# Patient Record
Sex: FEMALE | Race: WHITE | NOT HISPANIC OR LATINO | Employment: FULL TIME | URBAN - METROPOLITAN AREA
[De-identification: names, ages, dates, MRNs, and addresses within clinical notes are randomized per-mention and may not be internally consistent; named-entity substitution may affect disease eponyms.]

---

## 2024-08-05 PROBLEM — Z34.00 SUPERVISION OF NORMAL FIRST PREGNANCY, ANTEPARTUM: Status: ACTIVE | Noted: 2024-08-05

## 2024-08-05 PROBLEM — Z00.00 PREVENTATIVE HEALTH CARE: Status: RESOLVED | Noted: 2020-02-05 | Resolved: 2024-08-05

## 2024-08-05 PROBLEM — Z12.4 SCREENING FOR CERVICAL CANCER: Status: RESOLVED | Noted: 2023-02-18 | Resolved: 2024-08-05

## 2024-08-05 PROBLEM — K21.9 GASTROESOPHAGEAL REFLUX DISEASE WITHOUT ESOPHAGITIS: Status: RESOLVED | Noted: 2022-11-30 | Resolved: 2024-08-05

## 2024-08-05 PROBLEM — J01.10 ACUTE NON-RECURRENT FRONTAL SINUSITIS: Status: RESOLVED | Noted: 2020-05-17 | Resolved: 2024-08-05

## 2024-08-27 ENCOUNTER — ROUTINE PRENATAL (OUTPATIENT)
Dept: OBSTETRICS AND GYNECOLOGY | Facility: CLINIC | Age: 34
End: 2024-08-27
Payer: COMMERCIAL

## 2024-08-27 VITALS — SYSTOLIC BLOOD PRESSURE: 123 MMHG | BODY MASS INDEX: 28.79 KG/M2 | WEIGHT: 173 LBS | DIASTOLIC BLOOD PRESSURE: 81 MMHG

## 2024-08-27 DIAGNOSIS — Z34.00 SUPERVISION OF NORMAL FIRST PREGNANCY, ANTEPARTUM: Primary | ICD-10-CM

## 2024-08-27 LAB
GLUCOSE UR STRIP-MCNC: NEGATIVE MG/DL
PROT UR STRIP-MCNC: NEGATIVE MG/DL

## 2024-08-27 NOTE — PROGRESS NOTES
[facility]  OB Follow Up Visit    CC: Routine obstetrical visit    Prenatal care complicated by:  Patient Active Problem List   Diagnosis    Anxiety    Supervision of normal first pregnancy, antepartum     Subjective:   Kelley L Naegele is a 34 y.o.  12w5d patient being seen today for her obstetrical follow up visit. The patient has: No complaints, No leaking fluid, No vaginal bleeding, No contractions    History: Past medical and surgical history, medications, allergies, social history, and obstetrical history all reviewed and updated.    Objective:    Urine glucose/protein - See OB flow sheet      /81   Wt 78.5 kg (173 lb)   LMP 2024 (Exact Date)   BMI 28.79 kg/m²     General exam: Comfortable, NAD  FHR: 150 BPM   Uterine Size: size equals dates  Pelvic Exam: No    Assessment and Plan:  Diagnoses and all orders for this visit:    1. Supervision of normal first pregnancy, antepartum (Primary)  Overview:  EDC: 3/6/2025    Prenatal genetic screening: Nips     COVID-19 vaccine: None, booster recommended  Flu vaccine: Recommended  Tdap vaccine:      Assessment & Plan:  Reviewed prenatal labs  Patient desires nips testing today  Schedule anatomy ultrasound  Continue prenatal vitamin    Orders:  -     POC Urinalysis Dipstick  -     [id number] PLUS Core+ESS - Blood, Arm, Left  -     US Ob Detail Fetal Anatomy Single or First Gestation; Future      12w5d  Reassuring pregnancy progress    Counseling: Second trimester precautions  OB precautions, leaking, VB, emely stewart vs PTL/Labor    Questions answered    Return in about 4 weeks (around 2024) for Recheck.    Manuelito Moralez MD  2024

## 2024-08-28 ENCOUNTER — TELEPHONE (OUTPATIENT)
Dept: OBSTETRICS AND GYNECOLOGY | Facility: CLINIC | Age: 34
End: 2024-08-28
Payer: COMMERCIAL

## 2024-08-28 NOTE — TELEPHONE ENCOUNTER
Called patient left message.  Calling to inform pt of her appointment 10/21/24 @ 2:00 with Ultrasound & fu with Dr Hong @ 3:15.  Patient not aware.  Okay for Hub to Relay. Also

## 2024-08-29 NOTE — ASSESSMENT & PLAN NOTE
Reviewed prenatal labs  Patient desires nips testing today  Schedule anatomy ultrasound  Continue prenatal vitamin

## 2024-08-31 LAB
5P15 DELETION (CRI-DU-CHAT): NOT DETECTED
CFDNA.FET/CFDNA.TOTAL SFR FETUS: NORMAL %
CITATION REF LAB TEST: NORMAL
FET 13+18+21+X+Y ANEUP PLAS.CFDNA: NEGATIVE
FET 1P36 DEL RISK WBC.DNA+CFDNA QL: NOT DETECTED
FET 22Q11.2 DEL RISK WBC.DNA+CFDNA QL: NOT DETECTED
FET CHR 11Q23 DEL PLAS.CFDNA QL: NOT DETECTED
FET CHR 15Q11 DEL PLAS.CFDNA QL: NOT DETECTED
FET CHR 21 TS PLAS.CFDNA QL: NEGATIVE
FET CHR 4P16 DEL PLAS.CFDNA QL: NOT DETECTED
FET CHR 8Q24 DEL PLAS.CFDNA QL: NOT DETECTED
FET SEX PLAS.CFDNA DOSAGE CFDNA: NORMAL
FET TS 13 RISK PLAS.CFDNA QL: NEGATIVE
FET TS 18 RISK WBC.DNA+CFDNA QL: NEGATIVE
GA EST FROM CONCEPTION DATE: NORMAL D
GESTATIONAL AGE > 9:: YES
LAB DIRECTOR NAME PROVIDER: NORMAL
LAB DIRECTOR NAME PROVIDER: NORMAL
LABORATORY COMMENT REPORT: NORMAL
LIMITATIONS OF THE TEST: NORMAL
NEGATIVE PREDICTIVE VALUE: NORMAL
NOTE: NORMAL
PERFORMANCE CHARACTERISTICS: NORMAL
POSITIVE PREDICTIVE VALUE: NORMAL
REF LAB TEST METHOD: NORMAL
TEST PERFORMANCE INFO SPEC: NORMAL
TRIOSOMY 16: NOT DETECTED
TRISOMY 22: NOT DETECTED

## 2024-09-10 ENCOUNTER — TELEPHONE (OUTPATIENT)
Dept: OBSTETRICS AND GYNECOLOGY | Facility: CLINIC | Age: 34
End: 2024-09-10
Payer: COMMERCIAL

## 2024-09-10 NOTE — TELEPHONE ENCOUNTER
Caller: Naegele, Kelley L    Relationship: Self    Best call back number: [phone]    What is the best time to reach you: ASAP    Who are you requesting to speak with (clinical staff, provider,  specific staff member):     CLINICAL    Do you know the name of the person who called: NO    What was the call regarding:   LAB RESULTS    PLEASE CALL

## 2024-09-10 NOTE — TELEPHONE ENCOUNTER
Left a message for the patient to discuss her results. The NIPS test is negative and if she desires to know the gender is male.

## 2024-09-10 NOTE — TELEPHONE ENCOUNTER
----- Message from Manuelito Moralez sent at 9/9/2024  8:04 AM EDT -----  Please notify the patient that her Nips result is negative. If she wishes to know the gender it is male

## 2024-09-10 NOTE — TELEPHONE ENCOUNTER
Patient called and informed. She also received the call for her scheduled appointments and will keep them.

## 2024-09-23 ENCOUNTER — ROUTINE PRENATAL (OUTPATIENT)
Dept: OBSTETRICS AND GYNECOLOGY | Facility: CLINIC | Age: 34
End: 2024-09-23
Payer: COMMERCIAL

## 2024-09-23 VITALS — SYSTOLIC BLOOD PRESSURE: 136 MMHG | DIASTOLIC BLOOD PRESSURE: 83 MMHG | WEIGHT: 173.8 LBS | BODY MASS INDEX: 28.92 KG/M2

## 2024-09-23 DIAGNOSIS — Z34.00 SUPERVISION OF NORMAL FIRST PREGNANCY, ANTEPARTUM: Primary | ICD-10-CM

## 2024-09-23 LAB
GLUCOSE UR STRIP-MCNC: NEGATIVE MG/DL
PROT UR STRIP-MCNC: NEGATIVE MG/DL

## 2024-09-23 PROCEDURE — 0502F SUBSEQUENT PRENATAL CARE: CPT | Performed by: NURSE PRACTITIONER

## 2024-10-04 ENCOUNTER — CLINICAL SUPPORT (OUTPATIENT)
Dept: FAMILY MEDICINE CLINIC | Facility: CLINIC | Age: 34
End: 2024-10-04
Payer: COMMERCIAL

## 2024-10-04 DIAGNOSIS — Z23 NEED FOR INFLUENZA VACCINATION: Primary | ICD-10-CM

## 2024-10-04 PROCEDURE — 90656 IIV3 VACC NO PRSV 0.5 ML IM: CPT

## 2024-10-04 PROCEDURE — 90471 IMMUNIZATION ADMIN: CPT

## 2024-10-21 ENCOUNTER — ROUTINE PRENATAL (OUTPATIENT)
Dept: OBSTETRICS AND GYNECOLOGY | Facility: CLINIC | Age: 34
End: 2024-10-21
Payer: COMMERCIAL

## 2024-10-21 VITALS — DIASTOLIC BLOOD PRESSURE: 84 MMHG | BODY MASS INDEX: 28.46 KG/M2 | WEIGHT: 171 LBS | SYSTOLIC BLOOD PRESSURE: 125 MMHG

## 2024-10-21 DIAGNOSIS — Z34.00 SUPERVISION OF NORMAL FIRST PREGNANCY, ANTEPARTUM: Primary | ICD-10-CM

## 2024-10-21 PROBLEM — E03.9 HYPOTHYROIDISM AFFECTING PREGNANCY: Status: ACTIVE | Noted: 2024-10-21

## 2024-10-21 PROBLEM — O99.280 HYPOTHYROIDISM AFFECTING PREGNANCY: Status: ACTIVE | Noted: 2024-10-21

## 2024-10-21 LAB
GLUCOSE UR STRIP-MCNC: NEGATIVE MG/DL
PROT UR STRIP-MCNC: NEGATIVE MG/DL

## 2024-10-21 PROCEDURE — 0502F SUBSEQUENT PRENATAL CARE: CPT | Performed by: OBSTETRICS & GYNECOLOGY

## 2024-10-21 NOTE — PROGRESS NOTES
OB FOLLOW UP      Chief Complaint   Patient presents with    Routine Prenatal Visit     Subjective:   New patient to me    A lot of nausea, certain foods can exacerbate. Doing good now jesus w light snacks.    Questions regarding travel in pregnancy    Objective:  /84   Wt 77.6 kg (171 lb)   LMP 05/30/2024 (Exact Date)   BMI 28.46 kg/m²  -0.454 kg (-1 lb) Body mass index is 28.46 kg/m².    See OB flow sheet for fundal height (not performed if US day of OV), FHT, edema, cvx exam if performed, and Upro/Uglu.   Chaperone present during pelvic exam if performed.      Assessment and Plan:  20w4d     Diagnoses and all orders for this visit:    1. Supervision of normal first pregnancy, antepartum (Primary)  Overview:  YESICA finalized: 3/6/2025 by LMP and 13-week ultrasound per ACOG    Optional testing NIPS,CF/SMA,AFP: NIPS XY negative.  Declines CF/SMA and AFP 10/21/2024     COVID: Recommended  Flu: Vaccinated  Tdap:  RSV:    1hr Glucola:  FU RPR w glucola:  ? Desires Sterilization:    Anatomy US: [facility] 10/21/24 consistent w dates,  ant placenta, breech, wnl, completed wnl  FU US:    PROBLEM LIST/PLAN:   Anxiety-no medications.  Stable  Hypothyroidism-no medications   8/5 TSH 1.0, recheck w glucola    Orders:  -     POC Urinalysis Dipstick      Counseling:    Second trimester precautions  NAUSEA AND VOMITING in pregnancy discussed.  Recommend bland foods to include the BRAT (Bananas, Rice, Applesauce, Toast) diet, ginger ale, ginger snaps, Zup.  Try to avoid protein, milk products, anything spicy or gas producing, vegetables.  Those are harder to digest and can cause an upset stomach.  Avoid large meals and eat mostly easily digestible carbohydrates (toast, crackers, etc).   VACCINE importance in pregnancy discussed.  Maternal and fetal risk of not being vaccinated reviewed NLT increased risk maternal/fetal severity of illness/death, PTD, CS, hemorrhage, HTN, possible IUGR and/or IUFD.  Maternal and fetal/infant  benefit w vaccination.  FDA approval and ACOG/SMFM/CDC recommendation in pregnancy.  Questions answered.   Discussed recommend avoid airplane travel after 32 weeks gestation and POV traveling further than 2 hours from the local area after 34-36 weeks that is assuming no complications in the pregnancy..      Reassuring pregnancy progress. Questions answered.  Continue PNV.  Importance of healthy eating, obtaining sufficient sleep, and staying active unless hypertensive- activity modified as directed.    Return in about 4 weeks (around 11/18/2024) for FU OB w glucola, then OV 4weeks later.            Yisel Hong, DO  10/21/2024    [facility] [place] [facility]  [facility] [place] [address]  Dept: [phone]  Dept Fax: [phone]  Loc: [phone]  Loc Fax: [phone]

## 2024-11-26 ENCOUNTER — ROUTINE PRENATAL (OUTPATIENT)
Dept: OBSTETRICS AND GYNECOLOGY | Facility: CLINIC | Age: 34
End: 2024-11-26
Payer: COMMERCIAL

## 2024-11-26 VITALS — SYSTOLIC BLOOD PRESSURE: 129 MMHG | DIASTOLIC BLOOD PRESSURE: 82 MMHG | BODY MASS INDEX: 29.62 KG/M2 | WEIGHT: 178 LBS

## 2024-11-26 DIAGNOSIS — O99.280 HYPOTHYROIDISM AFFECTING PREGNANCY, ANTEPARTUM: ICD-10-CM

## 2024-11-26 DIAGNOSIS — E03.9 HYPOTHYROIDISM AFFECTING PREGNANCY, ANTEPARTUM: ICD-10-CM

## 2024-11-26 DIAGNOSIS — Z34.00 SUPERVISION OF NORMAL FIRST PREGNANCY, ANTEPARTUM: Primary | ICD-10-CM

## 2024-11-26 LAB
DEPRECATED RDW RBC AUTO: 38.8 FL (ref 37–54)
ERYTHROCYTE [DISTWIDTH] IN BLOOD BY AUTOMATED COUNT: 12.2 % (ref 12.3–15.4)
GLUCOSE 1H P GLC SERPL-MCNC: 152 MG/DL (ref 65–139)
GLUCOSE UR STRIP-MCNC: NEGATIVE MG/DL
HCT VFR BLD AUTO: 32 % (ref 34–46.6)
HGB BLD-MCNC: 10.6 G/DL (ref 12–15.9)
MCH RBC QN AUTO: 28.9 PG (ref 26.6–33)
MCHC RBC AUTO-ENTMCNC: 33.1 G/DL (ref 31.5–35.7)
MCV RBC AUTO: 87.2 FL (ref 79–97)
PLATELET # BLD AUTO: 306 10*3/MM3 (ref 140–450)
PMV BLD AUTO: 9.8 FL (ref 6–12)
PROT UR STRIP-MCNC: NEGATIVE MG/DL
RBC # BLD AUTO: 3.67 10*6/MM3 (ref 3.77–5.28)
WBC NRBC COR # BLD AUTO: 13.56 10*3/MM3 (ref 3.4–10.8)

## 2024-11-26 PROCEDURE — 0502F SUBSEQUENT PRENATAL CARE: CPT | Performed by: OBSTETRICS & GYNECOLOGY

## 2024-11-26 PROCEDURE — 84439 ASSAY OF FREE THYROXINE: CPT | Performed by: OBSTETRICS & GYNECOLOGY

## 2024-11-26 PROCEDURE — 84443 ASSAY THYROID STIM HORMONE: CPT | Performed by: OBSTETRICS & GYNECOLOGY

## 2024-11-26 PROCEDURE — 82950 GLUCOSE TEST: CPT | Performed by: OBSTETRICS & GYNECOLOGY

## 2024-11-26 PROCEDURE — 85027 COMPLETE CBC AUTOMATED: CPT | Performed by: OBSTETRICS & GYNECOLOGY

## 2024-11-26 NOTE — PROGRESS NOTES
[facility]  OB Follow Up Visit    CC: Routine obstetrical visit    Prenatal care complicated by:  Patient Active Problem List   Diagnosis    Anxiety    Supervision of normal first pregnancy, antepartum    Hypothyroidism affecting pregnancy     Subjective:   Kelley L Naegele is a 34 y.o.  25w5d patient being seen today for her obstetrical follow up visit. The patient has: No complaints, No leaking fluid, No vaginal bleeding, No contractions, Adequate FM    History: Past medical and surgical history, medications, allergies, social history, and obstetrical history all reviewed and updated.    Objective:    Urine glucose/protein - See OB flow sheet      /82   Wt 80.7 kg (178 lb)   LMP 2024 (Exact Date)   BMI 29.62 kg/m²     General exam: Comfortable, NAD  FHR: 125 BPM   Uterine Size:  25 cm  Pelvic Exam: No    Assessment and Plan:  Diagnoses and all orders for this visit:    1. Supervision of normal first pregnancy, antepartum (Primary)  Overview:  YESICA finalized: 3/6/2025 by LMP and 13-week ultrasound per ACOG    Optional testing NIPS,CF/SMA,AFP: NIPS XY negative.  Declines CF/SMA and AFP 10/21/2024     COVID: Booster done during pregnancy  Flu: Vaccinated  Tdap: Rx 2024  RSV:    Anatomy US: [facility] 10/21/24 consistent w dates,  ant placenta, breech, wnl, completed wnl    Orders:  -     POC Urinalysis Dipstick  -     CBC (No Diff); Future  -     Gestational Diabetes Screen 1 Hour; Future    2. Hypothyroidism affecting pregnancy, antepartum  -     TSH  -     T4, free      25w5d  Reassuring pregnancy progress    Counseling: OB precautions, leaking, VB, emely stewart vs PTL/Labor  [facility]    Questions answered    Return in about 4 weeks (around 2024) for Recheck.    Manuelito Moralez MD  2024

## 2024-11-27 DIAGNOSIS — Z34.00 SUPERVISION OF NORMAL FIRST PREGNANCY, ANTEPARTUM: Primary | ICD-10-CM

## 2024-11-27 LAB
T4 FREE SERPL-MCNC: 0.94 NG/DL (ref 0.92–1.68)
TSH SERPL DL<=0.05 MIU/L-ACNC: 1.34 UIU/ML (ref 0.27–4.2)

## 2024-11-27 RX ORDER — FERROUS SULFATE 325(65) MG
325 TABLET ORAL EVERY OTHER DAY
Qty: 30 TABLET | Refills: 3 | Status: SHIPPED | OUTPATIENT
Start: 2024-11-27

## 2024-12-09 ENCOUNTER — CLINICAL SUPPORT (OUTPATIENT)
Dept: FAMILY MEDICINE CLINIC | Facility: CLINIC | Age: 34
End: 2024-12-09
Payer: COMMERCIAL

## 2024-12-09 DIAGNOSIS — Z23 NEED FOR TDAP VACCINATION: Primary | ICD-10-CM

## 2024-12-09 PROCEDURE — 90471 IMMUNIZATION ADMIN: CPT | Performed by: OBSTETRICS & GYNECOLOGY

## 2024-12-09 PROCEDURE — 90715 TDAP VACCINE 7 YRS/> IM: CPT | Performed by: OBSTETRICS & GYNECOLOGY

## 2024-12-26 ENCOUNTER — ROUTINE PRENATAL (OUTPATIENT)
Dept: OBSTETRICS AND GYNECOLOGY | Facility: CLINIC | Age: 34
End: 2024-12-26
Payer: COMMERCIAL

## 2024-12-26 VITALS — DIASTOLIC BLOOD PRESSURE: 90 MMHG | BODY MASS INDEX: 29.62 KG/M2 | WEIGHT: 178 LBS | SYSTOLIC BLOOD PRESSURE: 131 MMHG

## 2024-12-26 DIAGNOSIS — E03.9 HYPOTHYROIDISM AFFECTING PREGNANCY, ANTEPARTUM: ICD-10-CM

## 2024-12-26 DIAGNOSIS — Z34.00 SUPERVISION OF NORMAL FIRST PREGNANCY, ANTEPARTUM: Primary | ICD-10-CM

## 2024-12-26 DIAGNOSIS — O16.3 ELEVATED BLOOD PRESSURE AFFECTING PREGNANCY IN THIRD TRIMESTER, ANTEPARTUM: ICD-10-CM

## 2024-12-26 DIAGNOSIS — O26.849 FETAL SIZE INCONSISTENT WITH DATES: ICD-10-CM

## 2024-12-26 DIAGNOSIS — O99.280 HYPOTHYROIDISM AFFECTING PREGNANCY, ANTEPARTUM: ICD-10-CM

## 2024-12-26 LAB
ALBUMIN SERPL-MCNC: 3.7 G/DL (ref 3.5–5.2)
ALBUMIN/GLOB SERPL: 1.3 G/DL
ALP SERPL-CCNC: 96 U/L (ref 39–117)
ALT SERPL W P-5'-P-CCNC: 16 U/L (ref 1–33)
ANION GAP SERPL CALCULATED.3IONS-SCNC: 12 MMOL/L (ref 5–15)
AST SERPL-CCNC: 15 U/L (ref 1–32)
BILIRUB SERPL-MCNC: 0.2 MG/DL (ref 0–1.2)
BUN SERPL-MCNC: 5 MG/DL (ref 6–20)
BUN/CREAT SERPL: 7.6 (ref 7–25)
CALCIUM SPEC-SCNC: 8.9 MG/DL (ref 8.6–10.5)
CHLORIDE SERPL-SCNC: 102 MMOL/L (ref 98–107)
CO2 SERPL-SCNC: 22 MMOL/L (ref 22–29)
CREAT SERPL-MCNC: 0.66 MG/DL (ref 0.57–1)
DEPRECATED RDW RBC AUTO: 39.8 FL (ref 37–54)
EGFRCR SERPLBLD CKD-EPI 2021: 119.7 ML/MIN/1.73
ERYTHROCYTE [DISTWIDTH] IN BLOOD BY AUTOMATED COUNT: 12.5 % (ref 12.3–15.4)
GLOBULIN UR ELPH-MCNC: 2.9 GM/DL
GLUCOSE SERPL-MCNC: 121 MG/DL (ref 65–99)
GLUCOSE UR STRIP-MCNC: ABNORMAL MG/DL
HCT VFR BLD AUTO: 33.9 % (ref 34–46.6)
HGB BLD-MCNC: 11 G/DL (ref 12–15.9)
MCH RBC QN AUTO: 28.6 PG (ref 26.6–33)
MCHC RBC AUTO-ENTMCNC: 32.4 G/DL (ref 31.5–35.7)
MCV RBC AUTO: 88.1 FL (ref 79–97)
PLATELET # BLD AUTO: 332 10*3/MM3 (ref 140–450)
PMV BLD AUTO: 9.5 FL (ref 6–12)
POTASSIUM SERPL-SCNC: 3.7 MMOL/L (ref 3.5–5.2)
PROT SERPL-MCNC: 6.6 G/DL (ref 6–8.5)
PROT UR STRIP-MCNC: NEGATIVE MG/DL
RBC # BLD AUTO: 3.85 10*6/MM3 (ref 3.77–5.28)
SODIUM SERPL-SCNC: 136 MMOL/L (ref 136–145)
WBC NRBC COR # BLD AUTO: 11.97 10*3/MM3 (ref 3.4–10.8)

## 2024-12-26 PROCEDURE — 80053 COMPREHEN METABOLIC PANEL: CPT | Performed by: OBSTETRICS & GYNECOLOGY

## 2024-12-26 PROCEDURE — 85027 COMPLETE CBC AUTOMATED: CPT | Performed by: OBSTETRICS & GYNECOLOGY

## 2024-12-26 NOTE — ASSESSMENT & PLAN NOTE
Continue prenatal vitamins  Fetal kick counts   labor warnings  I suspect the patient likely has a mild viral gastroenteritis.  We discussed call parameters and warning signs, to the hospital.  I encouraged oral hydration.

## 2024-12-26 NOTE — ASSESSMENT & PLAN NOTE
BP mildly elevated x 2 today.  No proteinuria.  Check CBC, CMP and urine PC ratio.  The patient will monitor blood pressures at home and notify me for any pressures that are systolic 140 or more diastolic 90 or more

## 2024-12-26 NOTE — PROGRESS NOTES
[facility]  OB Follow Up Visit    CC: Routine obstetrical visit    Prenatal care complicated by:  Patient Active Problem List   Diagnosis    Anxiety    Supervision of normal first pregnancy, antepartum    Hypothyroidism affecting pregnancy    Elevated blood pressure affecting pregnancy in third trimester, antepartum     Subjective:   Kelley L Naegele is a 34 y.o.  30w0d patient being seen today for her obstetrical follow up visit. The patient has: No leaking fluid, No vaginal bleeding, No contractions, Adequate FM  The patient reports she had some nausea and vomiting yesterday along with some diarrhea.  Her partner also feels similar.  No fever or chills.  No vomiting today.  She is still able to keep down fluids.    History: Past medical and surgical history, medications, allergies, social history, and obstetrical history all reviewed and updated.    Objective:    Urine glucose/protein - See OB flow sheet      /90   Wt 80.7 kg (178 lb)   LMP 2024 (Exact Date)   BMI 29.62 kg/m²     General exam: Comfortable, NAD  FHR: 130 BPM   Uterine Size:  31 cm  Pelvic Exam: No    Assessment and Plan:  Diagnoses and all orders for this visit:    1. Supervision of normal first pregnancy, antepartum (Primary)  Overview:  YESICA finalized: 3/6/2025 by LMP and 13-week ultrasound per ACOG    Optional testing NIPS,CF/SMA,AFP: NIPS XY negative.  Declines CF/SMA and AFP 10/21/2024     COVID: Booster done during pregnancy  Flu: Vaccinated  Tdap: Rx 2024    Anatomy US: [facility] 10/21/24 consistent w dates,  ant placenta, breech, wnl, completed wnl    Assessment & Plan:  Continue prenatal vitamins  Fetal kick counts   labor warnings  I suspect the patient likely has a mild viral gastroenteritis.  We discussed call parameters and warning signs, to the hospital.  I encouraged oral hydration.    Orders:  -     POC Urinalysis Dipstick    2. Hypothyroidism affecting pregnancy, antepartum  Assessment &  Plan:  Continue to monitor      3. Elevated blood pressure affecting pregnancy in third trimester, antepartum  Assessment & Plan:  BP mildly elevated x 2 today.  No proteinuria.  Check CBC, CMP and urine PC ratio.  The patient will monitor blood pressures at home and notify me for any pressures that are systolic 140 or more diastolic 90 or more    Orders:  -     CBC (No Diff)  -     Comprehensive Metabolic Panel  -     Protein / Creatinine Ratio, Urine - Urine, Clean Catch    4. Fetal size inconsistent with dates  -     US Ob 14 + Weeks Single or First Gestation; Future      30w0d  Reassuring pregnancy progress    Counseling: OB precautions, leaking, VB, emely stewart vs PTL/Labor  [facility]  HTN precautions reviewed: HA, vision change, RUQ/epigastric pain, edema    Questions answered    Return in about 2 weeks (around 1/9/2025) for Recheck.    Manuelito Moralez MD  12/26/2024

## 2024-12-27 ENCOUNTER — TELEPHONE (OUTPATIENT)
Dept: OBSTETRICS AND GYNECOLOGY | Facility: CLINIC | Age: 34
End: 2024-12-27
Payer: COMMERCIAL

## 2024-12-27 NOTE — TELEPHONE ENCOUNTER
----- Message from Manuelito Moralez sent at 12/27/2024  8:10 AM EST -----  Please notify the patient that her labs are reassuring.  Her anemia is slightly worse.  Please reinforce the importance of taking her medication once her nausea resolves.  If her nausea is not better in the next couple of days or if it gets worse, she has a fever, or feels she has been having dehydration symptoms she should present to labor and delivery for further evaluation.

## 2024-12-27 NOTE — TELEPHONE ENCOUNTER
Spoke with patient gave her results/recommendations, stated she is feeling a little better, she understand the importance of taking her medication and that If she gets worse than she will go to L&D

## 2025-01-07 ENCOUNTER — ROUTINE PRENATAL (OUTPATIENT)
Dept: OBSTETRICS AND GYNECOLOGY | Facility: CLINIC | Age: 35
End: 2025-01-07
Payer: COMMERCIAL

## 2025-01-07 VITALS — WEIGHT: 177 LBS | SYSTOLIC BLOOD PRESSURE: 136 MMHG | DIASTOLIC BLOOD PRESSURE: 87 MMHG | BODY MASS INDEX: 29.45 KG/M2

## 2025-01-07 DIAGNOSIS — O16.3 ELEVATED BLOOD PRESSURE AFFECTING PREGNANCY IN THIRD TRIMESTER, ANTEPARTUM: ICD-10-CM

## 2025-01-07 DIAGNOSIS — Z34.00 SUPERVISION OF NORMAL FIRST PREGNANCY, ANTEPARTUM: Primary | ICD-10-CM

## 2025-01-07 LAB
GLUCOSE UR STRIP-MCNC: NEGATIVE MG/DL
PROT UR STRIP-MCNC: NEGATIVE MG/DL

## 2025-01-07 NOTE — PROGRESS NOTES
OB FOLLOW UP      Chief Complaint   Patient presents with    Routine Prenatal Visit     Subjective:   No complaints.  Denies VB, leaking fluid, current regular cramping or contractions.    Objective:  /87   Wt 80.3 kg (177 lb)   LMP 2024 (Exact Date)   BMI 29.45 kg/m²  2.268 kg (5 lb)  Uterine Size: size equals dates  FHT: 110-160 BPM    See OB flow for edema, cvx exam if performed, and Upro/Uglu    Assessment and Plan:  31w5d  Reassuring pregnancy progress.  Questions answered.  Diagnoses and all orders for this visit:    1. Supervision of normal first pregnancy, antepartum (Primary)  Overview:  YESICA finalized: 3/6/2025 by LMP and 13-week ultrasound per ACOG    Optional testing NIPS,CF/SMA,AFP: NIPS XY negative.  Declines CF/SMA and AFP 10/21/2024     COVID: Booster done during pregnancy  Flu: Vaccinated  Tdap: Rx 2024  RSV:    1hr Glucola:  FU RPR w glucola:  ? Desires Sterilization:    Anatomy US: [facility] 10/21/24 consistent w dates,  ant placenta, breech, wnl, completed wnl  FU US:    PROBLEM LIST/PLAN:   Anxiety-no medications.  Stable  Hypothyroidism-no medications    TSH 1.0, recheck w glucola    Assessment & Plan:  Doing well, feeling much better  Fetal kick counts   labor precautions.     Orders:  -     POC Urinalysis Dipstick    2. Elevated blood pressure affecting pregnancy in third trimester, antepartum  Assessment & Plan:  Reports feeling better, states manual blood pressures at work run 110s/70s. She will continue to monitor and notify for any readings greater than 140/90.        Counseling:    OB precautions, leaking, VB, emely stewart vs PTL/Labor  [facility]C  Continue PNV.  Importance of healthy eating and staying active.    Return in about 20 days (around 2025) for Dr. Moralez, OB follow up.        Sim Chu, APRN  2025    [facility] [place] [facility] [place]  [facility]  [address]  [place] [address]  Dept: [phone]  Dept Fax:  [phone]  Loc: [phone]

## 2025-01-07 NOTE — ASSESSMENT & PLAN NOTE
Reports feeling better, states manual blood pressures at work run 110s/70s. She will continue to monitor and notify for any readings greater than 140/90.

## 2025-01-24 DIAGNOSIS — Z34.00 SUPERVISION OF NORMAL FIRST PREGNANCY, ANTEPARTUM: ICD-10-CM

## 2025-01-24 RX ORDER — FERROUS SULFATE 325(65) MG
325 TABLET ORAL EVERY OTHER DAY
Qty: 30 TABLET | Refills: 3 | Status: CANCELLED | OUTPATIENT
Start: 2025-01-24

## 2025-01-27 ENCOUNTER — ROUTINE PRENATAL (OUTPATIENT)
Dept: OBSTETRICS AND GYNECOLOGY | Facility: CLINIC | Age: 35
End: 2025-01-27
Payer: COMMERCIAL

## 2025-01-27 VITALS — WEIGHT: 176 LBS | SYSTOLIC BLOOD PRESSURE: 133 MMHG | DIASTOLIC BLOOD PRESSURE: 89 MMHG | BODY MASS INDEX: 29.29 KG/M2

## 2025-01-27 DIAGNOSIS — O99.280 HYPOTHYROIDISM AFFECTING PREGNANCY, ANTEPARTUM: ICD-10-CM

## 2025-01-27 DIAGNOSIS — Z34.00 SUPERVISION OF NORMAL FIRST PREGNANCY, ANTEPARTUM: Primary | ICD-10-CM

## 2025-01-27 DIAGNOSIS — E03.9 HYPOTHYROIDISM AFFECTING PREGNANCY, ANTEPARTUM: ICD-10-CM

## 2025-01-27 LAB
GLUCOSE UR STRIP-MCNC: NEGATIVE MG/DL
PROT UR STRIP-MCNC: NEGATIVE MG/DL

## 2025-01-27 NOTE — PROGRESS NOTES
[facility]  OB Follow Up Visit    CC: Routine obstetrical visit    Prenatal care complicated by:  Patient Active Problem List   Diagnosis    Anxiety    Supervision of normal first pregnancy, antepartum    Hypothyroidism affecting pregnancy    Elevated blood pressure affecting pregnancy in third trimester, antepartum     Subjective:   Kelley L Naegele is a 34 y.o.  34w4d patient being seen today for her obstetrical follow up visit. The patient has: No complaints, No leaking fluid, No vaginal bleeding, No contractions, Adequate FM    History: Past medical and surgical history, medications, allergies, social history, and obstetrical history all reviewed and updated.    Objective:    Urine glucose/protein - See OB flow sheet      /89   Wt 79.8 kg (176 lb)   LMP 2024 (Exact Date)   BMI 29.29 kg/m²     General exam: Comfortable, NAD  FHR: 148 BPM   Uterine Size:  36 cm  Pelvic Exam: No    Assessment and Plan:  Diagnoses and all orders for this visit:    1. Supervision of normal first pregnancy, antepartum (Primary)  Overview:  YESICA finalized: 3/6/2025 by LMP and 13-week ultrasound per ACOG    Optional testing NIPS,CF/SMA,AFP: NIPS XY negative.  Declines CF/SMA and AFP 10/21/2024     COVID: Booster done during pregnancy  Flu: Vaccinated  Tdap: Rx 2024  RSV:    Anatomy US: [facility] 10/21/24 consistent w dates,  ant placenta, breech, wnl, completed wnl      Assessment & Plan:  Continue prenatal vitamins  Fetal kick counts   labor warnings  To gain his growth ultrasound was reviewed.  The EFW is 6 pounds 4 ounces, 83rd percentile.  MELIA 16.53.  The fetal heart rate is 148 bpm.  Cephalic presentation is confirmed    Orders:  -     POC Urinalysis Dipstick    2. Hypothyroidism affecting pregnancy, antepartum  Assessment & Plan:  Continue to monitor        34w4d  Reassuring pregnancy progress    Counseling: OB precautions, leaking, VB, emely stewart vs PTL/Labor  [facility]C    Questions  answered    Return in about 2 weeks (around 2/10/2025) for Recheck.    Manuelito Moralez MD  01/27/2025

## 2025-01-30 NOTE — ASSESSMENT & PLAN NOTE
Continue prenatal vitamins  Fetal kick counts   labor warnings  To gain his growth ultrasound was reviewed.  The EFW is 6 pounds 4 ounces, 83rd percentile.  MELIA 16.53.  The fetal heart rate is 148 bpm.  Cephalic presentation is confirmed

## 2025-02-03 ENCOUNTER — PATIENT MESSAGE (OUTPATIENT)
Dept: OBSTETRICS AND GYNECOLOGY | Facility: CLINIC | Age: 35
End: 2025-02-03
Payer: COMMERCIAL

## 2025-02-11 ENCOUNTER — ROUTINE PRENATAL (OUTPATIENT)
Dept: OBSTETRICS AND GYNECOLOGY | Facility: CLINIC | Age: 35
End: 2025-02-11
Payer: COMMERCIAL

## 2025-02-11 ENCOUNTER — HOSPITAL ENCOUNTER (INPATIENT)
Facility: HOSPITAL | Age: 35
LOS: 4 days | Discharge: HOME | End: 2025-02-15
Attending: OBSTETRICS & GYNECOLOGY | Admitting: OBSTETRICS & GYNECOLOGY
Payer: COMMERCIAL

## 2025-02-11 VITALS — BODY MASS INDEX: 29.62 KG/M2 | DIASTOLIC BLOOD PRESSURE: 96 MMHG | WEIGHT: 178 LBS | SYSTOLIC BLOOD PRESSURE: 147 MMHG

## 2025-02-11 DIAGNOSIS — E03.9 HYPOTHYROIDISM AFFECTING PREGNANCY, ANTEPARTUM: ICD-10-CM

## 2025-02-11 DIAGNOSIS — O99.280 HYPOTHYROIDISM AFFECTING PREGNANCY, ANTEPARTUM: ICD-10-CM

## 2025-02-11 DIAGNOSIS — O13.3 GESTATIONAL HYPERTENSION, THIRD TRIMESTER: ICD-10-CM

## 2025-02-11 DIAGNOSIS — Z34.00 SUPERVISION OF NORMAL FIRST PREGNANCY, ANTEPARTUM: Primary | ICD-10-CM

## 2025-02-11 PROBLEM — O16.3 ELEVATED BLOOD PRESSURE AFFECTING PREGNANCY IN THIRD TRIMESTER, ANTEPARTUM: Status: RESOLVED | Noted: 2024-12-26 | Resolved: 2025-02-11

## 2025-02-11 PROBLEM — O14.93 PREECLAMPSIA, THIRD TRIMESTER: Status: ACTIVE | Noted: 2025-02-11

## 2025-02-11 LAB
ABO GROUP BLD: NORMAL
ALBUMIN SERPL-MCNC: 3.8 G/DL (ref 3.5–5.2)
ALBUMIN/GLOB SERPL: 1 G/DL
ALP SERPL-CCNC: 196 U/L (ref 39–117)
ALT SERPL W P-5'-P-CCNC: 35 U/L (ref 1–33)
ANION GAP SERPL CALCULATED.3IONS-SCNC: 10.8 MMOL/L (ref 5–15)
AST SERPL-CCNC: 26 U/L (ref 1–32)
BILIRUB SERPL-MCNC: 0.3 MG/DL (ref 0–1.2)
BLD GP AB SCN SERPL QL: NEGATIVE
BUN SERPL-MCNC: 5 MG/DL (ref 6–20)
BUN/CREAT SERPL: 6.3 (ref 7–25)
CALCIUM SPEC-SCNC: 9.8 MG/DL (ref 8.6–10.5)
CHLORIDE SERPL-SCNC: 103 MMOL/L (ref 98–107)
CO2 SERPL-SCNC: 22.2 MMOL/L (ref 22–29)
CREAT SERPL-MCNC: 0.79 MG/DL (ref 0.57–1)
CREAT UR-MCNC: 39.1 MG/DL
DEPRECATED RDW RBC AUTO: 40.6 FL (ref 37–54)
EGFRCR SERPLBLD CKD-EPI 2021: 102.1 ML/MIN/1.73
ERYTHROCYTE [DISTWIDTH] IN BLOOD BY AUTOMATED COUNT: 12.6 % (ref 12.3–15.4)
GLOBULIN UR ELPH-MCNC: 3.8 GM/DL
GLUCOSE SERPL-MCNC: 88 MG/DL (ref 65–99)
GLUCOSE UR STRIP-MCNC: NEGATIVE MG/DL
HCT VFR BLD AUTO: 39.3 % (ref 34–46.6)
HGB BLD-MCNC: 13 G/DL (ref 12–15.9)
MCH RBC QN AUTO: 29.1 PG (ref 26.6–33)
MCHC RBC AUTO-ENTMCNC: 33.1 G/DL (ref 31.5–35.7)
MCV RBC AUTO: 88.1 FL (ref 79–97)
PLATELET # BLD AUTO: 448 10*3/MM3 (ref 140–450)
PMV BLD AUTO: 9.6 FL (ref 6–12)
POTASSIUM SERPL-SCNC: 3.9 MMOL/L (ref 3.5–5.2)
PROT ?TM UR-MCNC: 21.6 MG/DL
PROT SERPL-MCNC: 7.6 G/DL (ref 6–8.5)
PROT UR STRIP-MCNC: ABNORMAL MG/DL
PROT/CREAT UR: 0.55 MG/G{CREAT}
RBC # BLD AUTO: 4.46 10*6/MM3 (ref 3.77–5.28)
RH BLD: POSITIVE
SODIUM SERPL-SCNC: 136 MMOL/L (ref 136–145)
T&S EXPIRATION DATE: NORMAL
TREPONEMA PALLIDUM IGG+IGM AB [PRESENCE] IN SERUM OR PLASMA BY IMMUNOASSAY: NORMAL
WBC NRBC COR # BLD AUTO: 10.8 10*3/MM3 (ref 3.4–10.8)

## 2025-02-11 PROCEDURE — 86900 BLOOD TYPING SEROLOGIC ABO: CPT | Performed by: OBSTETRICS & GYNECOLOGY

## 2025-02-11 PROCEDURE — 86901 BLOOD TYPING SEROLOGIC RH(D): CPT | Performed by: OBSTETRICS & GYNECOLOGY

## 2025-02-11 PROCEDURE — 86850 RBC ANTIBODY SCREEN: CPT | Performed by: OBSTETRICS & GYNECOLOGY

## 2025-02-11 PROCEDURE — 80053 COMPREHEN METABOLIC PANEL: CPT | Performed by: OBSTETRICS & GYNECOLOGY

## 2025-02-11 PROCEDURE — 59025 FETAL NON-STRESS TEST: CPT

## 2025-02-11 PROCEDURE — 25010000002 BETAMETHASONE ACET & SOD PHOS PER 4 MG: Performed by: OBSTETRICS & GYNECOLOGY

## 2025-02-11 PROCEDURE — 86780 TREPONEMA PALLIDUM: CPT | Performed by: OBSTETRICS & GYNECOLOGY

## 2025-02-11 PROCEDURE — 84156 ASSAY OF PROTEIN URINE: CPT | Performed by: OBSTETRICS & GYNECOLOGY

## 2025-02-11 PROCEDURE — 87653 STREP B DNA AMP PROBE: CPT | Performed by: OBSTETRICS & GYNECOLOGY

## 2025-02-11 PROCEDURE — 82570 ASSAY OF URINE CREATININE: CPT | Performed by: OBSTETRICS & GYNECOLOGY

## 2025-02-11 PROCEDURE — 85027 COMPLETE CBC AUTOMATED: CPT | Performed by: OBSTETRICS & GYNECOLOGY

## 2025-02-11 PROCEDURE — G0463 HOSPITAL OUTPT CLINIC VISIT: HCPCS

## 2025-02-11 RX ORDER — SODIUM CHLORIDE 0.9 % (FLUSH) 0.9 %
10 SYRINGE (ML) INJECTION AS NEEDED
Status: DISCONTINUED | OUTPATIENT
Start: 2025-02-11 | End: 2025-02-15 | Stop reason: HOSPADM

## 2025-02-11 RX ORDER — BETAMETHASONE SODIUM PHOSPHATE AND BETAMETHASONE ACETATE 3; 3 MG/ML; MG/ML
12 INJECTION, SUSPENSION INTRA-ARTICULAR; INTRALESIONAL; INTRAMUSCULAR; SOFT TISSUE EVERY 24 HOURS
Status: COMPLETED | OUTPATIENT
Start: 2025-02-11 | End: 2025-02-12

## 2025-02-11 RX ORDER — FAMOTIDINE 20 MG/1
20 TABLET, FILM COATED ORAL DAILY
Status: DISCONTINUED | OUTPATIENT
Start: 2025-02-11 | End: 2025-02-11 | Stop reason: HOSPADM

## 2025-02-11 RX ORDER — SODIUM CHLORIDE 0.9 % (FLUSH) 0.9 %
10 SYRINGE (ML) INJECTION EVERY 12 HOURS SCHEDULED
Status: DISCONTINUED | OUTPATIENT
Start: 2025-02-11 | End: 2025-02-15

## 2025-02-11 RX ORDER — LIDOCAINE HYDROCHLORIDE 10 MG/ML
0.5 INJECTION, SOLUTION EPIDURAL; INFILTRATION; INTRACAUDAL; PERINEURAL ONCE AS NEEDED
Status: DISCONTINUED | OUTPATIENT
Start: 2025-02-11 | End: 2025-02-15 | Stop reason: HOSPADM

## 2025-02-11 RX ORDER — ACETAMINOPHEN 325 MG/1
650 TABLET ORAL EVERY 4 HOURS PRN
Status: DISCONTINUED | OUTPATIENT
Start: 2025-02-11 | End: 2025-02-15 | Stop reason: HOSPADM

## 2025-02-11 RX ORDER — ONDANSETRON 2 MG/ML
4 INJECTION INTRAMUSCULAR; INTRAVENOUS EVERY 8 HOURS PRN
Status: DISCONTINUED | OUTPATIENT
Start: 2025-02-11 | End: 2025-02-15 | Stop reason: HOSPADM

## 2025-02-11 RX ORDER — DOCUSATE SODIUM 100 MG/1
100 CAPSULE, LIQUID FILLED ORAL 2 TIMES DAILY PRN
Status: DISCONTINUED | OUTPATIENT
Start: 2025-02-11 | End: 2025-02-15 | Stop reason: HOSPADM

## 2025-02-11 RX ORDER — CALCIUM CARBONATE 500 MG/1
2 TABLET, CHEWABLE ORAL DAILY PRN
Status: DISCONTINUED | OUTPATIENT
Start: 2025-02-11 | End: 2025-02-11 | Stop reason: HOSPADM

## 2025-02-11 RX ORDER — BISACODYL 10 MG
10 SUPPOSITORY, RECTAL RECTAL DAILY PRN
Status: DISCONTINUED | OUTPATIENT
Start: 2025-02-11 | End: 2025-02-11 | Stop reason: HOSPADM

## 2025-02-11 RX ORDER — SODIUM CHLORIDE 9 MG/ML
40 INJECTION, SOLUTION INTRAVENOUS AS NEEDED
Status: DISCONTINUED | OUTPATIENT
Start: 2025-02-11 | End: 2025-02-15 | Stop reason: HOSPADM

## 2025-02-11 RX ORDER — ONDANSETRON 4 MG/1
8 TABLET, ORALLY DISINTEGRATING ORAL EVERY 8 HOURS PRN
Status: DISCONTINUED | OUTPATIENT
Start: 2025-02-11 | End: 2025-02-15 | Stop reason: HOSPADM

## 2025-02-11 RX ADMIN — BETAMETHASONE ACETATE AND BETAMETHASONE SODIUM PHOSPHATE 12 MG: 3; 3 INJECTION, SUSPENSION INTRA-ARTICULAR; INTRALESIONAL; INTRAMUSCULAR; SOFT TISSUE at 17:37

## 2025-02-11 RX ADMIN — ACETAMINOPHEN 650 MG: 325 TABLET ORAL at 21:11

## 2025-02-11 RX ADMIN — Medication 10 ML: at 21:06

## 2025-02-11 NOTE — H&P
ROSY Montiel  Obstetric History and Physical    Chief Complaint   Patient presents with    Elevated Blood Pressure       Subjective     Patient is a 34 y.o. female  currently at 36w5d, who presented from the office for preeclampsia workup.  Patient was noted to have elevated blood pressures in the office.  She was scheduled for induction on Friday due to gestational hypertension.  Patient denies any headache, visual disturbance or increased swelling.  She reports some mild right upper quadrant abdominal pain.  Denies any vaginal bleeding or loss of fluid.  Positive fetal movements.    PNC provided by:  [facility] [place]. Her prenatal care is complicated by gestational hypertension.  Her previous obstetric/gynecological history is noted for is non-contributory.    The following portions of the patients history were reviewed and updated as appropriate: current medications, allergies, past medical history, past surgical history, past family history, past social history, and problem list .       Prenatal Information:  Prenatal Results       Initial Prenatal Labs       Test Value Reference Range Date Time    Hemoglobin  12.8 g/dL 12.0 - 15.9 24 0944    Hematocrit  38.4 % 34.0 - 46.6 24 0944    Platelets  376 10*3/mm3 140 - 450 24 0944    Rubella IgG  1.82 index Immune >0.99 24 0944    Hepatitis B SAg  Non-Reactive  Non-Reactive 24 0944    Hepatitis C Ab  Non-Reactive  Non-Reactive 24 0944    RPR  Non-Reactive  Non-Reactive 24 0944    T. Pallidum Ab         ABO  B   24 0944    Rh  Positive   24 0944    Antibody Screen  Negative   24 09    HIV  Non-Reactive  Non-Reactive 24 0944    Urine Culture  No growth   24 0933    Gonorrhea  Negative  Negative 24 0933    Chlamydia  Negative  Negative 24 0933    TSH  1.340 uIU/mL 0.270 - 4.200 24 1539       1.090 uIU/mL 0.270 - 4.200 24 0944    HgB A1c         Varicella IgG        Hemoglobinopathy  Fractionation  Comment   08/05/24 0944    Hemoglobinopathy (genetic testing)        Cystic fibrosis         Spinal muscular atrophy        Fragile X                  Fetal testing        Test Value Reference Range Date Time    NIPT        MSAFP        AFP-4                  2nd and 3rd Trimester       Test Value Reference Range Date Time    Hemoglobin (repeated)  13.0 g/dL 12.0 - 15.9 02/11/25 1518       11.0 g/dL 12.0 - 15.9 12/26/24 1516       10.6 g/dL 12.0 - 15.9 11/26/24 1539    Hematocrit (repeated)  39.3 % 34.0 - 46.6 02/11/25 1518       33.9 % 34.0 - 46.6 12/26/24 1516       32.0 % 34.0 - 46.6 11/26/24 1539    Platelets   448 10*3/mm3 140 - 450 02/11/25 1518       332 10*3/mm3 140 - 450 12/26/24 1516       306 10*3/mm3 140 - 450 11/26/24 1539       376 10*3/mm3 140 - 450 08/05/24 0944    1 hour GTT   152 mg/dL 65 - 139 11/26/24 1539    Antibody Screen (repeated)        3rd TM syphilis scrn (repeated)  RPR         3rd TM syphilis scrn (repeated) TP-Ab        3rd TM syphilis screen TB-Ab (FTA)        Syphilis cascade test TP-Ab (EIA)        Syphilis cascade TPPA        GTT Fasting        GTT 1 Hr        GTT 2 Hr        GTT 3 Hr        Group B Strep                  Other testing        Test Value Reference Range Date Time    Parvo IgG         CMV IgG                   Drug Screening       Test Value Reference Range Date Time    Amphetamine Screen        Barbiturate Screen  Negative  Negative 08/05/24 0933    Benzodiazepine Screen  Negative  Negative 08/05/24 0933    Methadone Screen  Negative  Negative 08/05/24 0933    Phencyclidine Screen        Opiates Screen  Negative  Negative 08/05/24 0933    THC Screen  Negative  Negative 08/05/24 0933    Cocaine Screen  Negative  Negative 08/05/24 0933    Propoxyphene Screen        Buprenorphine Screen        Methamphetamine Screen        Oxycodone Screen  Negative  Negative 08/05/24 0933    Tricyclic Antidepressants Screen                  Legend    ^: Historical                           External Prenatal Results       Pregnancy Outside Results - Transcribed From Office Records - See Scanned Records For Details       Test Value Date Time    ABO  B  08/05/24 0944    Rh  Positive  08/05/24 0944    Antibody Screen  Negative  08/05/24 0944    Varicella IgG       Rubella  1.82 index 08/05/24 0944    Hgb  13.0 g/dL 02/11/25 1518       11.0 g/dL 12/26/24 1516       10.6 g/dL 11/26/24 1539       12.8 g/dL 08/05/24 0944    Hct  39.3 % 02/11/25 1518       33.9 % 12/26/24 1516       32.0 % 11/26/24 1539       38.4 % 08/05/24 0944    HgB A1c        1h GTT  152 mg/dL 11/26/24 1539    3h GTT Fasting       3h GTT 1 hour       3h GTT 2 hour       3h GTT 3 hour        Gonorrhea (discrete)  Negative  08/05/24 0933    Chlamydia (discrete)  Negative  08/05/24 0933    RPR  Non-Reactive  08/05/24 0944    Syphils cascade: TP-Ab (FTA)       TP-Ab       TP-Ab (EIA)       TPPA       HBsAg  Non-Reactive  08/05/24 0944    Herpes Simplex Virus PCR       Herpes Simplex VIrus Culture       HIV  Non-Reactive  08/05/24 0944    Hep C RNA Quant PCR       Hep C Antibody  Non-Reactive  08/05/24 0944    AFP       NIPT       Cystic Fibrosis (Candice)       Cystic Fibroisis        Spinal Muscular atrophy       Fragile X       Group B Strep       GBS Susceptibility to Clindamycin       GBS Susceptibility to Erythromycin       Fetal Fibronectin       Genetic Testing, Maternal Blood                 Drug Screening       Test Value Date Time    Urine Drug Screen       Amphetamine Screen       Barbiturate Screen  Negative  08/05/24 0933    Benzodiazepine Screen  Negative  08/05/24 0933    Methadone Screen  Negative  08/05/24 0933    Phencyclidine Screen       Opiates Screen  Negative  08/05/24 0933    THC Screen  Negative  08/05/24 0933    Cocaine Screen       Propoxyphene Screen       Buprenorphine Screen       Methamphetamine Screen       Oxycodone Screen  Negative  08/05/24 0933    Tricyclic Antidepressants Screen                  Legend    ^: Historical                             Past OB History:     OB History    Para Term  AB Living   1 0 0 0 0 0   SAB IAB Ectopic Molar Multiple Live Births   0 0 0 0 0 0      # Outcome Date GA Lbr Pratik/2nd Weight Sex Type Anes PTL Lv   1 Current                Past Medical History: Past Medical History:   Diagnosis Date    Adverse effect of unspecified drugs, medicaments and biological substances, initial encounter     Allergic contact dermatitis     Allergic rhinitis     Alopecia     Anxiety     Anxiety     Chiggers     Depression     Fatigue     Hyperglycemia     Hyperlipidemia     Hypothyroidism     Impetigo     Influenza     Insect bite     Low back pain     Otalgia, left     Pharyngitis     Rash     Sinusitis     Skin tag     Sore throat     Tick bite     Urinary frequency     Urinary urgency     UTI (urinary tract infection)     Viral illness     Viral URI       Past Surgical History Past Surgical History:   Procedure Laterality Date    WISDOM TOOTH EXTRACTION        Family History: Family History   Problem Relation Age of Onset    Cancer Father     Skin cancer Father     Hypothyroidism Mother     Anxiety disorder Mother     Deep vein thrombosis Paternal Grandmother         FVL.  Pts father was neg for FVL and pt also was neg    Heart disease Paternal Grandmother     Factor V Leiden deficiency Paternal Grandmother     Breast cancer Maternal Aunt     Cancer Maternal Aunt     Valvular heart disease Maternal Uncle     Stroke Paternal Aunt         Factor five    Factor V Leiden deficiency Paternal Aunt     Breast cancer Other     Heart disease Other     Pulmonary embolism Neg Hx       Social History:  reports that she has never smoked. She has never been exposed to tobacco smoke. She has never used smokeless tobacco.   reports that she does not currently use alcohol after a past usage of about 1.0 standard drink of alcohol per week.   reports no history of drug use.        General  ROS: Pertinent items are noted in HPI    Objective       Vital Signs Range for the last 24 hours  Temperature: Temp:  [98.3 °F (36.8 °C)] 98.3 °F (36.8 °C)   Temp Source: Temp src: Oral   BP: BP: (126-147)/(83-96) 133/86   Pulse: Heart Rate:  [103-106] 106   Respirations: Resp:  [18] 18   SPO2: SpO2:  [97 %-99 %] 97 %   O2 Amount (l/min):     O2 Devices     Weight: Weight:  [80.7 kg (178 lb)] 80.7 kg (178 lb)     Physical Examination: General appearance - alert, well appearing, and in no distress  Mental status - alert, oriented to person, place, and time  Chest - clear to auscultation, no wheezes, rales or rhonchi, symmetric air entry  Heart - normal rate, regular rhythm, normal S1, S2, no murmurs, rubs, clicks or gallops  Abdomen - soft, nontender, nondistended, no masses or organomegaly  Pelvic - normal external genitalia, vulva, vagina, cervix, uterus and adnexa  Back exam - full range of motion, no tenderness, palpable spasm or pain on motion  Neurological - alert, oriented, normal speech, no focal findings or movement disorder noted  Extremities - peripheral pulses normal, no pedal edema, no clubbing or cyanosis  Skin - normal coloration and turgor, no rashes, no suspicious skin lesions noted    Presentation: Vertex   Cervix: Exam by: Dr. Moralez   Dilation: 1 to 2 cm   Effacement: 50%   Station: 3       Fetal Heart Rate Assessment   Method:     Beats/min:     Baseline:     Variability:     Accels:     Decels:           Uterine Assessment   Method:     Frequency (min):     Ctx Count in 10 min:     Duration:     Intensity:         Dunnellon Units:       GBS is unknown      Assessment & Plan       Preeclampsia, third trimester        Assessment:  1.  Intrauterine pregnancy at 36w5d gestation with reactive fetal status.    2.  IUP at 36 weeks and 5 days estimate gestational age with preeclampsia.  Her labs showed a protein creatinine ratio 0.55 and her ALT was mildly elevated at 35.  Will plan on admitting  patient for observation and will give her steroid.  Will recheck on her condition tomorrow.    Plan:  Admit for observation  Betamethasone now and repeat in 12 hours  Continuous monitoring for now and then NSTs 3 times daily  Regular diet  Repeat labs in the morning      Francisco Franklin MD  2/11/2025  16:39 EST

## 2025-02-11 NOTE — ASSESSMENT & PLAN NOTE
The patient has now had multiple elevations in her blood pressure over several visits.  This confirms a diagnosis of gestational hypertension.  I have recommended delivery at 37 weeks of gestation due to gestational hypertension.  My induction of labor counseling.  NST and labs today.  Plan for delivery on Friday as long as all is reassuring with evaluation today.

## 2025-02-11 NOTE — NON STRESS TEST
"Obstetrical Non-stress Test Interpretation     Name:  Kelley L Naegele  MRN: [id number]    34 y.o. female  at 36w5d    Indication: Elevated bp, sent from office, admitted for observation      Fetal Assessment  Fetal Movement: active  Fetal HR Assessment Method: external  Fetal HR (beats/min): 145  Fetal HR Baseline: normal range  Fetal HR Variability: moderate (amplitude range 6 to 25 bpm)  Fetal HR Accelerations: greater than/equal to 15 bpm, lasting at least 15 seconds  Fetal HR Decelerations: variable  Sinusoidal Pattern Present: absent    /81   Pulse 106   Temp 98.3 °F (36.8 °C) (Oral)   Resp 18   Ht 165.1 cm (65\")   LMP 2024 (Exact Date)   SpO2 97%   BMI 29.62 kg/m²   Patient Vitals for the past 24 hrs:   BP Temp Temp src Pulse Resp SpO2 Height   25 1645 136/81 -- -- 106 -- -- --   25 1638 139/80 -- -- 108 -- -- --   25 1615 137/86 -- -- 106 -- -- --   25 1600 133/86 -- -- 106 18 -- --   25 1545 126/83 -- -- 103 -- 97 % --   25 1531 138/94 98.3 °F (36.8 °C) Oral 106 18 99 % 165.1 cm (65\")        Reason for test: Hypertension  Date of Test: 2025  Time frame of test: 2659-9595  RN NST Interpretation: Reactive      Teresa Park RN  2025  17:59 EST  "

## 2025-02-11 NOTE — PROGRESS NOTES
[facility]  OB Follow Up Visit    CC: Routine obstetrical visit    Prenatal care complicated by:  Patient Active Problem List   Diagnosis    Anxiety    Supervision of normal first pregnancy, antepartum    Hypothyroidism affecting pregnancy    Gestational hypertension, third trimester     Subjective:   Kelley L Naegele is a 34 y.o.  36w5d patient being seen today for her obstetrical follow up visit. The patient has: No complaints, No leaking fluid, No vaginal bleeding, Adequate FM  Denies any significant contractions    History: Past medical and surgical history, medications, allergies, social history, and obstetrical history all reviewed and updated.    Objective:    Urine glucose/protein - See OB flow sheet      /96   Wt 80.7 kg (178 lb)   LMP 2024 (Exact Date)   BMI 29.62 kg/m²     General exam: Comfortable, NAD  FHR: 156 BPM   Uterine Size:  37 cm  Pelvic Exam: Yes.  Presentation: cephalic. Dilation: 1cm. Effacement: 50%. Station: -3.    Assessment and Plan:  Diagnoses and all orders for this visit:    1. Supervision of normal first pregnancy, antepartum (Primary)  Overview:  YESICA finalized: 3/6/2025 by LMP and 13-week ultrasound per ACOG    Optional testing NIPS,CF/SMA,AFP: NIPS XY negative.  Declines CF/SMA and AFP 10/21/2024     COVID: Booster done during pregnancy  Flu: Vaccinated  Tdap: Rx 2024  RSV:    Anatomy US: [facility] 10/21/24 consistent w dates,  ant placenta, breech, wnl, completed wnl      Assessment & Plan:  Continue prenatal vitamins  Fetal kick counts  Labor instructions  GBS collected    Orders:  -     POC Urinalysis Dipstick  -     Group B Strep Molecular by PCR - Swab, Vaginal/Rectum    2. Hypothyroidism affecting pregnancy, antepartum    3. Gestational hypertension, third trimester  Assessment & Plan:  The patient has now had multiple elevations in her blood pressure over several visits.  This confirms a diagnosis of gestational hypertension.  I have  recommended delivery at 37 weeks of gestation due to gestational hypertension.  My induction of labor counseling.  NST and labs today.  Plan for delivery on Friday as long as all is reassuring with evaluation today.    Orders:  -     Fetal Nonstress Test; Future      36w5d  Reassuring pregnancy progress    Counseling: OB precautions, leaking, VB, emely stewart vs PTL/Labor  FKC  HTN precautions reviewed: HA, vision change, RUQ/epigastric pain, edema    Questions answered    Return in about 2 weeks (around 2/25/2025) for Postpartum Visit.    Manuelito Moralez MD  02/11/2025

## 2025-02-11 NOTE — NURSING NOTE
Informed Dr. Franklin of pt's arrival to ob triage after being sent from the office by Dr. Moralez for elevated blood pressures. Pt is a  at 36w5d. Labs and bps reviewed with MD. He states he will talk to Dr. Moralez and admit pt for observation and steroids.  
yes

## 2025-02-12 ENCOUNTER — TELEPHONE (OUTPATIENT)
Dept: OBSTETRICS AND GYNECOLOGY | Facility: CLINIC | Age: 35
End: 2025-02-12
Payer: COMMERCIAL

## 2025-02-12 PROBLEM — O14.90 PREECLAMPSIA: Status: ACTIVE | Noted: 2025-02-12

## 2025-02-12 LAB
ALBUMIN SERPL-MCNC: 3.5 G/DL (ref 3.5–5.2)
ALBUMIN/GLOB SERPL: 0.9 G/DL
ALP SERPL-CCNC: 178 U/L (ref 39–117)
ALT SERPL W P-5'-P-CCNC: 31 U/L (ref 1–33)
ANION GAP SERPL CALCULATED.3IONS-SCNC: 12.9 MMOL/L (ref 5–15)
AST SERPL-CCNC: 22 U/L (ref 1–32)
BILIRUB SERPL-MCNC: 0.3 MG/DL (ref 0–1.2)
BUN SERPL-MCNC: 5 MG/DL (ref 6–20)
BUN/CREAT SERPL: 8.1 (ref 7–25)
CALCIUM SPEC-SCNC: 9 MG/DL (ref 8.6–10.5)
CHLORIDE SERPL-SCNC: 100 MMOL/L (ref 98–107)
CO2 SERPL-SCNC: 19.1 MMOL/L (ref 22–29)
CREAT SERPL-MCNC: 0.62 MG/DL (ref 0.57–1)
CREAT UR-MCNC: 44.6 MG/DL
DEPRECATED RDW RBC AUTO: 39.8 FL (ref 37–54)
EGFRCR SERPLBLD CKD-EPI 2021: 121.5 ML/MIN/1.73
ERYTHROCYTE [DISTWIDTH] IN BLOOD BY AUTOMATED COUNT: 12.6 % (ref 12.3–15.4)
GLOBULIN UR ELPH-MCNC: 3.7 GM/DL
GLUCOSE SERPL-MCNC: 109 MG/DL (ref 65–99)
GP B STREP DNA VAG+RECTUM QL NAA+PROBE: POSITIVE
HCT VFR BLD AUTO: 35.9 % (ref 34–46.6)
HGB BLD-MCNC: 12 G/DL (ref 12–15.9)
MCH RBC QN AUTO: 28.9 PG (ref 26.6–33)
MCHC RBC AUTO-ENTMCNC: 33.4 G/DL (ref 31.5–35.7)
MCV RBC AUTO: 86.5 FL (ref 79–97)
PLATELET # BLD AUTO: 417 10*3/MM3 (ref 140–450)
PMV BLD AUTO: 9.6 FL (ref 6–12)
POTASSIUM SERPL-SCNC: 4.2 MMOL/L (ref 3.5–5.2)
PROT ?TM UR-MCNC: 21.3 MG/DL
PROT SERPL-MCNC: 7.2 G/DL (ref 6–8.5)
PROT/CREAT UR: 0.48 MG/G{CREAT}
RBC # BLD AUTO: 4.15 10*6/MM3 (ref 3.77–5.28)
SODIUM SERPL-SCNC: 132 MMOL/L (ref 136–145)
WBC NRBC COR # BLD AUTO: 16.39 10*3/MM3 (ref 3.4–10.8)

## 2025-02-12 PROCEDURE — 25010000002 BETAMETHASONE ACET & SOD PHOS PER 4 MG: Performed by: OBSTETRICS & GYNECOLOGY

## 2025-02-12 PROCEDURE — 84156 ASSAY OF PROTEIN URINE: CPT | Performed by: OBSTETRICS & GYNECOLOGY

## 2025-02-12 PROCEDURE — 82570 ASSAY OF URINE CREATININE: CPT | Performed by: OBSTETRICS & GYNECOLOGY

## 2025-02-12 PROCEDURE — 25810000003 LACTATED RINGERS PER 1000 ML: Performed by: OBSTETRICS & GYNECOLOGY

## 2025-02-12 PROCEDURE — 25010000002 PENICILLIN G POTASSIUM PER 600000 UNITS: Performed by: OBSTETRICS & GYNECOLOGY

## 2025-02-12 PROCEDURE — 85027 COMPLETE CBC AUTOMATED: CPT | Performed by: OBSTETRICS & GYNECOLOGY

## 2025-02-12 PROCEDURE — 80053 COMPREHEN METABOLIC PANEL: CPT | Performed by: OBSTETRICS & GYNECOLOGY

## 2025-02-12 RX ORDER — MISOPROSTOL 100 MCG
50 TABLET ORAL ONCE
Status: COMPLETED | OUTPATIENT
Start: 2025-02-12 | End: 2025-02-12

## 2025-02-12 RX ORDER — OXYTOCIN/0.9 % SODIUM CHLORIDE 30/500 ML
1 PLASTIC BAG, INJECTION (ML) INTRAVENOUS
Status: DISCONTINUED | OUTPATIENT
Start: 2025-02-13 | End: 2025-02-15 | Stop reason: HOSPADM

## 2025-02-12 RX ORDER — SODIUM CHLORIDE, SODIUM LACTATE, POTASSIUM CHLORIDE, CALCIUM CHLORIDE 600; 310; 30; 20 MG/100ML; MG/100ML; MG/100ML; MG/100ML
125 INJECTION, SOLUTION INTRAVENOUS CONTINUOUS
Status: ACTIVE | OUTPATIENT
Start: 2025-02-12 | End: 2025-02-14

## 2025-02-12 RX ADMIN — BETAMETHASONE ACETATE AND BETAMETHASONE SODIUM PHOSPHATE 12 MG: 3; 3 INJECTION, SUSPENSION INTRA-ARTICULAR; INTRALESIONAL; INTRAMUSCULAR; SOFT TISSUE at 06:31

## 2025-02-12 RX ADMIN — SODIUM CHLORIDE, POTASSIUM CHLORIDE, SODIUM LACTATE AND CALCIUM CHLORIDE 125 ML/HR: 600; 310; 30; 20 INJECTION, SOLUTION INTRAVENOUS at 20:26

## 2025-02-12 RX ADMIN — PENICILLIN G POTASSIUM 5 MILLION UNITS: 5000000 INJECTION, POWDER, FOR SOLUTION INTRAMUSCULAR; INTRAVENOUS at 20:27

## 2025-02-12 RX ADMIN — Medication 50 MCG: at 20:29

## 2025-02-12 NOTE — TELEPHONE ENCOUNTER
I called this patient for Kel to give her the postpartum appointment and atient advises still in the hospital and the hospitalist made the decision to go ahead and induce her and to start her on the pitocin. pt wasn't for sure if Dr Moralez was aware of nt, but wanted to let him know. Also, the L/D induction needs to be reschedule (showing schld for 2/15) & then not sure about the postpartum appt now, it still may be ok. Pt said uses her mychrt and would watch it t seeif anything changes.   Sent secure chat to Vanessa Avalos, Clinical Coordinator and Medical Assistant, [facility].

## 2025-02-12 NOTE — NON STRESS TEST
"Obstetrical Non-stress Test Interpretation     Name:  Kelley L Naegele  MRN: [id number]  34 y.o. female  at 36w6d    Indication: HTN      Fetal Assessment  Fetal Movement: active  Fetal HR Assessment Method: external  Fetal HR (beats/min): 120  Fetal HR Baseline: normal range  Fetal HR Variability: moderate (amplitude range 6 to 25 bpm)  Fetal HR Accelerations: episodic, greater than/equal to 15 bpm, lasting at least 15 seconds  Fetal HR Decelerations: absent  Sinusoidal Pattern Present: absent  Fetal HR Tracing Category: Category I    /82 (BP Location: Left arm, Patient Position: Sitting)   Pulse 118   Temp 98.4 °F (36.9 °C) (Oral)   Resp 20   Ht 165.1 cm (65\")   LMP 2024 (Exact Date)   SpO2 97%   BMI 29.62 kg/m²     Reason for test: Other (Comment)  Date of Test: 2025  Time frame of test: 4727-7191  RN NST Interpretation: Reactive      Maryan Alexandre RN  2025  15:44 EST  "

## 2025-02-12 NOTE — PLAN OF CARE
Goal Outcome Evaluation:  Plan of Care Reviewed With: patient, spouse        Progress: improving  Outcome Evaluation: VS WNL throughout this shift. Patient c/o upper neck pain during shift assessment, tylenol administered and pain resolved. Patient ambulated to bathroom independently.

## 2025-02-12 NOTE — NON STRESS TEST
"Obstetrical Non-stress Test Interpretation     Name:  Kelley L Naegele  MRN: [id number]    34 y.o. female  at 36w6d    Indication: HTN, observation patient.       Fetal Assessment  Fetal Movement: active  Fetal HR Assessment Method: external  Fetal HR (beats/min): 130  Fetal HR Baseline: normal range  Fetal HR Variability: moderate (amplitude range 6 to 25 bpm)  Fetal HR Accelerations: episodic, greater than/equal to 15 bpm, lasting at least 15 seconds  Fetal HR Decelerations: absent  Sinusoidal Pattern Present: absent    /80 (BP Location: Left arm, Patient Position: Sitting)   Pulse 85   Temp 97.7 °F (36.5 °C) (Oral)   Resp 18   Ht 165.1 cm (65\")   LMP 2024 (Exact Date)   SpO2 97%   BMI 29.62 kg/m²     Reason for test: Hypertension  Date of Test: 2025  Time frame of test: 0900 - 1004.  RN NST Interpretation: Reactive      Jessie Clarke RN  2025  10:10 EST  "

## 2025-02-12 NOTE — PROGRESS NOTES
Antepartum Progress note     S: Patient seen and examined. Patient is a 34 y.o. at 36w6d who presented for PIH workup.  Patient denies any vaginal bleeding, loss of fluid or decreased fetal movements.  This morning patient reports feeling better.  Right upper quadrant pain still present but improved.        O:   Vitals:    02/12/25 0525   BP: 120/77   Pulse: 91   Resp: 18   Temp: 97.7 °F (36.5 °C)   SpO2:        Cvx- 1-2cm / 50% / -1  Fetal HR: Category I  Contractions: Irregular    APPEARANCE:  Well-nourished, well-developed, alert and oriented female.  In no acute distress  NECK:  Symmetrical without masses or adenopathy.  The thyroid is not enlarged, non-tender.  RESPIRATORY:  Lungs are clear to auscultation bilaterally.  Effort is normal.  HEART:  Regular rate, zero murmurs rubs or gallops.  ABDOMEN:  Soft, gravid uterus, no si/sx of placental abruption, chorio   EXTREMITIES:  No cyanosis, clubbing, edema, DTRS WNL, negative clonus      Assessment/Plan   -Patent is a 34 y.o. at 36w6d with preeclampsia.  She had abnormal protein creatinine ratio 0.55 and elevated LFTs.  Patient was complaining of right upper quadrant pain yesterday.  She was admitted and received betamethasone.  She will be given another betamethasone dose this morning 12 hours after the first dose.  We will repeat her labs.  If her labs remain abnormal given persistent right upper quadrant pain, we will proceed with induction of labor.       Francisco Franklin MD  2/12/2025 06:49 EST

## 2025-02-13 ENCOUNTER — ANESTHESIA (OUTPATIENT)
Dept: LABOR AND DELIVERY | Facility: HOSPITAL | Age: 35
End: 2025-02-13
Payer: COMMERCIAL

## 2025-02-13 ENCOUNTER — ANESTHESIA EVENT (OUTPATIENT)
Dept: LABOR AND DELIVERY | Facility: HOSPITAL | Age: 35
End: 2025-02-13
Payer: COMMERCIAL

## 2025-02-13 LAB
AMPHET+METHAMPHET UR QL: NEGATIVE
AMPHETAMINES UR QL: NEGATIVE
ATMOSPHERIC PRESS: 743.9 MMHG
ATMOSPHERIC PRESS: 745.5 MMHG
BARBITURATES UR QL SCN: NEGATIVE
BASE EXCESS BLDCOA CALC-SCNC: -4.8 MMOL/L (ref -2–2)
BASE EXCESS BLDCOV CALC-SCNC: -7.6 MMOL/L (ref -30–30)
BDY SITE: ABNORMAL
BDY SITE: ABNORMAL
BENZODIAZ UR QL SCN: NEGATIVE
BUPRENORPHINE SERPL-MCNC: NEGATIVE NG/ML
CANNABINOIDS SERPL QL: NEGATIVE
COCAINE UR QL: NEGATIVE
FENTANYL UR-MCNC: NEGATIVE NG/ML
HCO3 BLDCOA-SCNC: 22.8 MMOL/L
HCO3 BLDCOV-SCNC: 17.6 MMOL/L
INHALED O2 CONCENTRATION: 21 %
INHALED O2 CONCENTRATION: 21 %
METHADONE UR QL SCN: NEGATIVE
OPIATES UR QL: NEGATIVE
OXYCODONE UR QL SCN: NEGATIVE
PCO2 BLDCOA: 50.2 MMHG (ref 33–49)
PCO2 BLDCOV: 34.7 MM HG (ref 35–51.3)
PCP UR QL SCN: NEGATIVE
PH BLDCOA: 7.27 PH UNITS (ref 7.18–7.34)
PH BLDCOV: 7.31 PH UNITS (ref 7.26–7.4)
PO2 BLDCOA: 17.2 MMHG
PO2 BLDCOV: 28.5 MM HG (ref 19–39)
SAO2 % BLDCOA: 19.2 %
SAO2 % BLDCOV: 49.1 %
TRICYCLICS UR QL SCN: NEGATIVE

## 2025-02-13 PROCEDURE — 82803 BLOOD GASES ANY COMBINATION: CPT

## 2025-02-13 PROCEDURE — 80307 DRUG TEST PRSMV CHEM ANLYZR: CPT | Performed by: OBSTETRICS & GYNECOLOGY

## 2025-02-13 PROCEDURE — 0UQG7ZZ REPAIR VAGINA, VIA NATURAL OR ARTIFICIAL OPENING: ICD-10-PCS | Performed by: OBSTETRICS & GYNECOLOGY

## 2025-02-13 PROCEDURE — 25010000002 LIDOCAINE 1 % SOLUTION: Performed by: NURSE ANESTHETIST, CERTIFIED REGISTERED

## 2025-02-13 PROCEDURE — 25010000002 OXYTOCIN PER 10 UNITS: Performed by: OBSTETRICS & GYNECOLOGY

## 2025-02-13 PROCEDURE — 25010000002 LIDOCAINE-EPINEPHRINE (PF) 2 %-1:200000 SOLUTION: Performed by: NURSE ANESTHETIST, CERTIFIED REGISTERED

## 2025-02-13 PROCEDURE — 25010000002 FENTANYL CITRATE (PF) 50 MCG/ML SOLUTION: Performed by: NURSE ANESTHETIST, CERTIFIED REGISTERED

## 2025-02-13 PROCEDURE — C1755 CATHETER, INTRASPINAL: HCPCS | Performed by: NURSE ANESTHETIST, CERTIFIED REGISTERED

## 2025-02-13 PROCEDURE — 25010000002 ROPIVACAINE PER 1 MG: Performed by: NURSE ANESTHETIST, CERTIFIED REGISTERED

## 2025-02-13 PROCEDURE — 59025 FETAL NON-STRESS TEST: CPT

## 2025-02-13 PROCEDURE — 25810000003 SODIUM CHLORIDE 0.9 % SOLUTION: Performed by: OBSTETRICS & GYNECOLOGY

## 2025-02-13 PROCEDURE — 25010000002 OXYTOCIN PER 10 UNITS

## 2025-02-13 PROCEDURE — 51702 INSERT TEMP BLADDER CATH: CPT

## 2025-02-13 PROCEDURE — 25010000002 PENICILLIN G POTASSIUM PER 600000 UNITS: Performed by: OBSTETRICS & GYNECOLOGY

## 2025-02-13 RX ORDER — FENTANYL/ROPIVACAINE/NS/PF 2MCG/ML-.2
PLASTIC BAG, INJECTION (ML) INJECTION
Status: COMPLETED
Start: 2025-02-13 | End: 2025-02-13

## 2025-02-13 RX ORDER — OXYTOCIN/0.9 % SODIUM CHLORIDE 30/500 ML
125 PLASTIC BAG, INJECTION (ML) INTRAVENOUS ONCE AS NEEDED
Status: COMPLETED | OUTPATIENT
Start: 2025-02-13 | End: 2025-02-13

## 2025-02-13 RX ORDER — FENTANYL CITRATE 50 UG/ML
INJECTION, SOLUTION INTRAMUSCULAR; INTRAVENOUS
Status: COMPLETED | OUTPATIENT
Start: 2025-02-13 | End: 2025-02-13

## 2025-02-13 RX ORDER — LIDOCAINE HYDROCHLORIDE AND EPINEPHRINE 15; 5 MG/ML; UG/ML
INJECTION, SOLUTION EPIDURAL
Status: COMPLETED | OUTPATIENT
Start: 2025-02-13 | End: 2025-02-13

## 2025-02-13 RX ORDER — FENTANYL CITRATE 50 UG/ML
INJECTION, SOLUTION INTRAMUSCULAR; INTRAVENOUS
Status: COMPLETED
Start: 2025-02-13 | End: 2025-02-13

## 2025-02-13 RX ORDER — HYDROCODONE BITARTRATE AND ACETAMINOPHEN 10; 325 MG/1; MG/1
1 TABLET ORAL EVERY 4 HOURS PRN
Status: DISCONTINUED | OUTPATIENT
Start: 2025-02-13 | End: 2025-02-15 | Stop reason: HOSPADM

## 2025-02-13 RX ORDER — CARBOPROST TROMETHAMINE 250 UG/ML
INJECTION, SOLUTION INTRAMUSCULAR
Status: DISPENSED
Start: 2025-02-13 | End: 2025-02-13

## 2025-02-13 RX ORDER — HYDROCODONE BITARTRATE AND ACETAMINOPHEN 5; 325 MG/1; MG/1
1 TABLET ORAL EVERY 4 HOURS PRN
Status: DISCONTINUED | OUTPATIENT
Start: 2025-02-13 | End: 2025-02-15 | Stop reason: HOSPADM

## 2025-02-13 RX ORDER — OXYTOCIN 10 [USP'U]/ML
INJECTION, SOLUTION INTRAMUSCULAR; INTRAVENOUS
Status: COMPLETED
Start: 2025-02-13 | End: 2025-02-13

## 2025-02-13 RX ORDER — ONDANSETRON 4 MG/1
4 TABLET, ORALLY DISINTEGRATING ORAL EVERY 6 HOURS PRN
Status: DISCONTINUED | OUTPATIENT
Start: 2025-02-13 | End: 2025-02-15 | Stop reason: HOSPADM

## 2025-02-13 RX ORDER — LIDOCAINE HCL/EPINEPHRINE/PF 2%-1:200K
VIAL (ML) INJECTION AS NEEDED
Status: DISCONTINUED | OUTPATIENT
Start: 2025-02-13 | End: 2025-02-13 | Stop reason: SURG

## 2025-02-13 RX ORDER — LIDOCAINE HYDROCHLORIDE 10 MG/ML
INJECTION, SOLUTION INFILTRATION; PERINEURAL AS NEEDED
Status: DISCONTINUED | OUTPATIENT
Start: 2025-02-13 | End: 2025-02-13 | Stop reason: SURG

## 2025-02-13 RX ORDER — IBUPROFEN 600 MG/1
600 TABLET, FILM COATED ORAL EVERY 6 HOURS PRN
Status: DISCONTINUED | OUTPATIENT
Start: 2025-02-13 | End: 2025-02-15 | Stop reason: HOSPADM

## 2025-02-13 RX ORDER — ACETAMINOPHEN 325 MG/1
650 TABLET ORAL EVERY 6 HOURS PRN
Status: DISCONTINUED | OUTPATIENT
Start: 2025-02-13 | End: 2025-02-15 | Stop reason: HOSPADM

## 2025-02-13 RX ORDER — CALCIUM CARBONATE 500 MG/1
2 TABLET, CHEWABLE ORAL 3 TIMES DAILY PRN
Status: DISCONTINUED | OUTPATIENT
Start: 2025-02-13 | End: 2025-02-15 | Stop reason: HOSPADM

## 2025-02-13 RX ORDER — ONDANSETRON 2 MG/ML
4 INJECTION INTRAMUSCULAR; INTRAVENOUS EVERY 6 HOURS PRN
Status: DISCONTINUED | OUTPATIENT
Start: 2025-02-13 | End: 2025-02-15 | Stop reason: HOSPADM

## 2025-02-13 RX ORDER — SODIUM CHLORIDE 0.9 % (FLUSH) 0.9 %
1-10 SYRINGE (ML) INJECTION AS NEEDED
Status: DISCONTINUED | OUTPATIENT
Start: 2025-02-13 | End: 2025-02-15 | Stop reason: HOSPADM

## 2025-02-13 RX ORDER — LIDOCAINE HCL/EPINEPHRINE/PF 2%-1:200K
VIAL (ML) INJECTION
Status: COMPLETED
Start: 2025-02-13 | End: 2025-02-13

## 2025-02-13 RX ORDER — ROPIVACAINE HYDROCHLORIDE 2 MG/ML
INJECTION, SOLUTION EPIDURAL; INFILTRATION; PERINEURAL
Status: COMPLETED | OUTPATIENT
Start: 2025-02-13 | End: 2025-02-13

## 2025-02-13 RX ORDER — MISOPROSTOL 200 UG/1
TABLET ORAL
Status: COMPLETED
Start: 2025-02-13 | End: 2025-02-13

## 2025-02-13 RX ORDER — DOCUSATE SODIUM 100 MG/1
100 CAPSULE, LIQUID FILLED ORAL 2 TIMES DAILY
Status: DISCONTINUED | OUTPATIENT
Start: 2025-02-13 | End: 2025-02-15 | Stop reason: HOSPADM

## 2025-02-13 RX ORDER — MISOPROSTOL 200 UG/1
800 TABLET ORAL ONCE
Status: COMPLETED | OUTPATIENT
Start: 2025-02-13 | End: 2025-02-13

## 2025-02-13 RX ORDER — EPHEDRINE SULFATE 50 MG/ML
5 INJECTION, SOLUTION INTRAVENOUS
Status: DISCONTINUED | OUTPATIENT
Start: 2025-02-13 | End: 2025-02-13 | Stop reason: HOSPADM

## 2025-02-13 RX ADMIN — WITCH HAZEL: 500 SOLUTION RECTAL; TOPICAL at 19:32

## 2025-02-13 RX ADMIN — LIDOCAINE HYDROCHLORIDE 1 ML: 10 INJECTION, SOLUTION INFILTRATION; PERINEURAL at 05:12

## 2025-02-13 RX ADMIN — Medication 10 ML: at 21:48

## 2025-02-13 RX ADMIN — IBUPROFEN 600 MG: 600 TABLET, FILM COATED ORAL at 13:32

## 2025-02-13 RX ADMIN — BENZOCAINE AND LEVOMENTHOL: 200; 5 SPRAY TOPICAL at 10:18

## 2025-02-13 RX ADMIN — OXYTOCIN 2 MILLI-UNITS/MIN: 10 INJECTION, SOLUTION INTRAMUSCULAR; INTRAVENOUS at 00:22

## 2025-02-13 RX ADMIN — LIDOCAINE HYDROCHLORIDE AND EPINEPHRINE 3 ML: 15; 5 INJECTION, SOLUTION EPIDURAL at 00:58

## 2025-02-13 RX ADMIN — SODIUM CHLORIDE 2.5 MILLION UNITS: 9 INJECTION, SOLUTION INTRAVENOUS at 00:22

## 2025-02-13 RX ADMIN — MISOPROSTOL 800 MCG: 200 TABLET ORAL at 07:40

## 2025-02-13 RX ADMIN — SODIUM CHLORIDE 2.5 MILLION UNITS: 9 INJECTION, SOLUTION INTRAVENOUS at 06:11

## 2025-02-13 RX ADMIN — WITCH HAZEL: 500 SOLUTION RECTAL; TOPICAL at 10:18

## 2025-02-13 RX ADMIN — LIDOCAINE HYDROCHLORIDE,EPINEPHRINE BITARTRATE 8 ML: 20; .005 INJECTION, SOLUTION EPIDURAL; INFILTRATION; INTRACAUDAL; PERINEURAL at 04:15

## 2025-02-13 RX ADMIN — OXYTOCIN 10 UNITS: 10 INJECTION, SOLUTION INTRAMUSCULAR; INTRAVENOUS at 07:43

## 2025-02-13 RX ADMIN — EPHEDRINE SULFATE 5 MG: 50 INJECTION INTRAVENOUS at 01:28

## 2025-02-13 RX ADMIN — LIDOCAINE HYDROCHLORIDE AND EPINEPHRINE 3 ML: 15; 5 INJECTION, SOLUTION EPIDURAL at 05:14

## 2025-02-13 RX ADMIN — DOCUSATE SODIUM 100 MG: 100 CAPSULE, LIQUID FILLED ORAL at 21:48

## 2025-02-13 RX ADMIN — OXYTOCIN 125 ML/HR: 10 INJECTION, SOLUTION INTRAMUSCULAR; INTRAVENOUS at 10:18

## 2025-02-13 RX ADMIN — ROPIVACAINE HYDROCHLORIDE 8 ML: 2 INJECTION, SOLUTION EPIDURAL; INFILTRATION; PERINEURAL at 01:03

## 2025-02-13 RX ADMIN — Medication 10 ML/HR: at 01:06

## 2025-02-13 RX ADMIN — ACETAMINOPHEN 650 MG: 325 TABLET ORAL at 17:19

## 2025-02-13 RX ADMIN — DOCUSATE SODIUM 100 MG: 100 CAPSULE, LIQUID FILLED ORAL at 13:31

## 2025-02-13 RX ADMIN — FENTANYL CITRATE 100 MCG: 50 INJECTION, SOLUTION INTRAMUSCULAR; INTRAVENOUS at 04:15

## 2025-02-13 RX ADMIN — FENTANYL CITRATE 100 MCG: 50 INJECTION, SOLUTION INTRAMUSCULAR; INTRAVENOUS at 01:03

## 2025-02-13 NOTE — PLAN OF CARE
Problem: Adult Inpatient Plan of Care  Goal: Plan of Care Review  2/13/2025 0826 by Eusebia Izquierdo RN  Outcome: Progressing  2/13/2025 0826 by Eusebia Izquierdo RN  Outcome: Progressing  Goal: Patient-Specific Goal (Individualized)  2/13/2025 0826 by Eusebia Izquierdo RN  Outcome: Progressing  2/13/2025 0826 by Eusebia Izquierdo RN  Outcome: Progressing  Goal: Absence of Hospital-Acquired Illness or Injury  2/13/2025 0826 by Eusebia Izquierdo RN  Outcome: Progressing  2/13/2025 0826 by Eusebia Izquierdo RN  Outcome: Progressing  Intervention: Identify and Manage Fall Risk  Recent Flowsheet Documentation  Taken 2/12/2025 1915 by Eusebia Izquierdo RN  Safety Promotion/Fall Prevention:   safety round/check completed   room organization consistent  Intervention: Prevent and Manage VTE (Venous Thromboembolism) Risk  Recent Flowsheet Documentation  Taken 2/12/2025 1915 by Eusebia Izquierdo RN  VTE Prevention/Management:   bilateral   SCDs (sequential compression devices) on  Goal: Optimal Comfort and Wellbeing  2/13/2025 0826 by Eusebia Izquierdo RN  Outcome: Progressing  2/13/2025 0826 by Eusebia Izquierdo RN  Outcome: Progressing  Intervention: Provide Person-Centered Care  Recent Flowsheet Documentation  Taken 2/12/2025 1915 by Eusebia Izquierdo RN  Trust Relationship/Rapport:   care explained   choices provided   emotional support provided   empathic listening provided   questions answered   questions encouraged   reassurance provided   thoughts/feelings acknowledged  Goal: Readiness for Transition of Care  2/13/2025 0826 by Eusebia Izquierdo RN  Outcome: Progressing  2/13/2025 0826 by Eusebia Izquierdo RN  Outcome: Progressing     Problem: Hypertensive Disorders in Pregnancy  Goal: Patient-Fetal Stabilization  2/13/2025 0826 by Eusebia Izquierdo RN  Outcome: Progressing  2/13/2025 0826 by Eusebia Izquierdo RN  Outcome:  Progressing  Intervention: Monitor and Manage Symptom Progression  Recent Flowsheet Documentation  Taken 2/12/2025 1915 by Eusebia Izquierdo RN  Medication Review/Management: medications reviewed     Problem: Labor  Goal: Hemostasis  2/13/2025 0826 by Eusebia Izquierdo RN  Outcome: Met  2/13/2025 0826 by Eusebia Izquierdo RN  Outcome: Met  Goal: Stable Fetal Wellbeing  2/13/2025 0826 by Eusebia Izquierdo RN  Outcome: Met  2/13/2025 0826 by Eusebia Izquierdo RN  Outcome: Met  Goal: Effective Progression to Delivery  2/13/2025 0826 by Eusebia Izquierdo RN  Outcome: Met  2/13/2025 0826 by Eusebia Izquierdo RN  Outcome: Met  Goal: Absence of Infection Signs and Symptoms  2/13/2025 0826 by Eusebia Izquierdo RN  Outcome: Met  2/13/2025 0826 by Eusebia Izquierdo RN  Outcome: Met  Goal: Acceptable Pain Control  2/13/2025 0826 by Eusebia Izquierdo RN  Outcome: Met  2/13/2025 0826 by Eusebia Izquierdo RN  Outcome: Met  Goal: Normal Uterine Contraction Pattern  2/13/2025 0826 by Eusebia Izquierdo RN  Outcome: Met  2/13/2025 0826 by Eusebia Izquierdo RN  Outcome: Met   Goal Outcome Evaluation:

## 2025-02-13 NOTE — ANESTHESIA PROCEDURE NOTES
CSE Block    Pre-sedation assessment completed: 2/13/2025 5:24 AM    Patient location during procedure: OB  Start time: 2/13/2025 5:02 AM  End time: 2/13/2025 5:24 AM  Reason for block: labor pain  Staffing  Resident/CRNA: Madeleine Davidson, JESSICA  Performed by: Madeleine Davidson, CRNA  Authorized by: Madeleine Davidson CRNA    Preanesthetic Checklist  Completed: patient identified, IV checked, site marked, risks and benefits discussed, surgical consent, monitors and equipment checked, pre-op evaluation and timeout performed  CSE  Patient position: sitting  Patient monitoring: blood pressure monitoring and continuous pulse oximetry  Procedures: landmark technique and palpation technique  Spinal Needle  Needle type: Pencan   Needle gauge: 27 G  Approach: midline  Location: L4-5  Fluid Appearance: clear    Epidural Needle  Injection technique: LEVY saline  Needle type: Tuohy   Needle gauge: 17 G  Location: L4-L5  Level: 4-5  Loss of Resistance: 6cm  Cath Depth at Skin (cm): 11  Aspiration: negative  Test dose: negative      Catheter  Catheter type: end hole  Catheter size: 19 G  Assessment  Dressing:biopatch applied, occlusive dressing applied and secured with tape  Pt Tolerance:patient tolerated the procedure well with no apparent complications  Complications:no

## 2025-02-13 NOTE — PLAN OF CARE
Problem: Adult Inpatient Plan of Care  Goal: Plan of Care Review  Outcome: Progressing  Goal: Patient-Specific Goal (Individualized)  Outcome: Progressing  Goal: Absence of Hospital-Acquired Illness or Injury  Outcome: Progressing  Intervention: Identify and Manage Fall Risk  Recent Flowsheet Documentation  Taken 2/12/2025 1915 by Eusebia Izquierdo RN  Safety Promotion/Fall Prevention:   safety round/check completed   room organization consistent  Intervention: Prevent and Manage VTE (Venous Thromboembolism) Risk  Recent Flowsheet Documentation  Taken 2/12/2025 1915 by Eusebia Izquierdo RN  VTE Prevention/Management:   bilateral   SCDs (sequential compression devices) on  Goal: Optimal Comfort and Wellbeing  Outcome: Progressing  Intervention: Provide Person-Centered Care  Recent Flowsheet Documentation  Taken 2/12/2025 1915 by Eusebia Izquierdo RN  Trust Relationship/Rapport:   care explained   choices provided   emotional support provided   empathic listening provided   questions answered   questions encouraged   reassurance provided   thoughts/feelings acknowledged  Goal: Readiness for Transition of Care  Outcome: Progressing     Problem: Hypertensive Disorders in Pregnancy  Goal: Patient-Fetal Stabilization  Outcome: Progressing  Intervention: Monitor and Manage Symptom Progression  Recent Flowsheet Documentation  Taken 2/12/2025 1915 by Eusebia Izquierdo RN  Medication Review/Management: medications reviewed     Problem: Labor  Goal: Hemostasis  Outcome: Met  Goal: Stable Fetal Wellbeing  Outcome: Met  Goal: Effective Progression to Delivery  Outcome: Met  Goal: Absence of Infection Signs and Symptoms  Outcome: Met  Goal: Acceptable Pain Control  Outcome: Met  Goal: Normal Uterine Contraction Pattern  Outcome: Met   Goal Outcome Evaluation:

## 2025-02-13 NOTE — LACTATION NOTE
LC in to help with multiple feedings today. Infant sleepy at first feeding, but did well with nipple shield. At next feeding infant did not latch due to sleepiness. Parents requested donor breast milk. LC encouraged mom to try to latch at every feeding for 15-20 minutes and then pump after inadequate feeding or if latch was not achieved. Mom requested DBM at 1600 feeding and did not want to latch at this time. LC set up hospital pump and instructed on use and cleaning of pump and parts, discussed breast milk storage at hospital and at home. Verbalized understanding. Discussed pumping every 3 hours for 15 mins for proper stimulation and adequate milk supply. Pt to call LC as needed.

## 2025-02-13 NOTE — L&D DELIVERY NOTE
[facility]  Vaginal Delivery Note    Delivery     Delivery: Vaginal, Spontaneous    YOB: 2023   Time of Birth:  Gestational Age 7:33 AM  37w0d     Anesthesia: Spinal;Epidural    Delivering clinician: Francisco Franklin   Forceps?   No   Vacuum? Yes  Vacuum Delivery  Vacuum attempted? Yes   Vacuum indication: Maternal exhaustion   Vacuum type: bell cup   Application location:     First Attempt     Time applied: 06:48   Time removed: 06 55   Second Attempt    Time applied: 0709   Time removed: 07 11   Third Attempt Not attempted   Time applied:     Time removed:     Number of pulls: 3   Number of pop-offs: 1   Low-end pressure range: 4   High-end pressure range: 20   Total application time: 9 minutes   Applied by: ORALIA   Failed? No       Shoulder dystocia present: No        Delivery narrative: Viable male infant weighing 6 pounds 10.5 ounces with Apgars of 8 and 9,was delivered from OP position over midline episiotomy.  Vacuum was attempted x 2 but discontinued prior to delivery due to poor maternal pushing efforts.  Infant's nose and mouth were bulb suction and the cord was clamped cut after 30 seconds.  Cord gas and cord blood within obtained.  Placenta was delivered via gentle traction.  Inspection revealed midline vaginal laceration in addition to the perineal episiotomy.  Both were repaired with 3-0 Vicryl suture in the usual fashion.  Bimanual exam was performed with release of several clots.  At this point the uterus was noted to be firm.    Infant    Findings: male infant     Infant observations: Weight: 3020 g (6 lb 10.5 oz)  Length: 19.5 in  Observations/Comments:        Apgars: 8  @ 1 minute /    9  @ 5 minutes         Placenta, Cord, and Fluid    Placenta delivered  Spontaneous at   2/13/2025  7:38 AM    Cord: 3 vessels present.   Nuchal Cord?  yes; Number of nuchal loops present:  1   Cord blood obtained: Yes   Cord gases obtained:  Yes   Cord gas results: Venous:  No results found for:  "\"PHCVEN\", \"BECVEN\"    Arterial:    pH, Cord Arterial   Date Value Ref Range Status   02/13/2025 7.27 7.18 - 7.34 pH Units Final     Base Exc, Cord Arterial   Date Value Ref Range Status   02/13/2025 -4.8 (L) -2.0 - 2.0 mmol/L Final     Comment:     Serial Number: [id number]:  [id number]        Repair    Episiotomy: Median    Yes  Suture used for repair: 3-0 Vicryl    Lacerations: Yes  Laceration Information  Laceration Repaired?   Perineal: None     Periurethral:       Labial:       Sulcus:       Vaginal: Midline vaginal Yes   Cervical:         Suture used for repair: 3-0 Vicryl  Laceration Length for 3rd or 4th degree lacerations: No    Estimated Blood Loss: Est. Blood Loss (mL): 300 mL (Filed from Delivery Summary) (02/13/25 9321)           Complications  none    Disposition  Mother to Mother Baby/Postpartum  in stable condition currently.  Baby to remains with mom  in stable condition currently.      Francisco Franklin MD  02/13/25  08:04 EST      "

## 2025-02-13 NOTE — ANESTHESIA PROCEDURE NOTES
Labor Epidural    Pre-sedation assessment completed: 2/13/2025 1:13 AM    Patient reassessed immediately prior to procedure    Patient location during procedure: OB  Start Time: 2/13/2025 12:50 AM  Stop Time: 2/13/2025 1:13 AM  Performed By  CRNA/CAA: Madeleine Davidson CRNA  Preanesthetic Checklist  Completed: patient identified, IV checked, site marked, risks and benefits discussed, surgical consent, monitors and equipment checked, pre-op evaluation and timeout performed  Prep:  Pt Position:sitting  Sterile Tech:gloves and sterile barrier  Prep:povidone-iodine 7.5% surgical scrub  Monitoring:blood pressure monitoring and continuous pulse oximetry  Epidural Block Procedure:  Approach:midline  Guidance:landmark technique and palpation technique  Location:L4-L5  Needle Type:Tuohy  Needle Gauge:17 G  Loss of Resistance Medium: saline  Loss of Resistance: 5cm  Cath Depth at skin:10 cm  Paresthesia: none  Aspiration:negative  Test Dose:negative  Medication: ropivacaine (NAROPIN) 0.2 % injection - Injection   8 mL - 2/13/2025 1:03:00 AM  fentaNYL citrate (PF) (SUBLIMAZE) injection - Epidural   100 mcg - 2/13/2025 1:03:00 AM  lidocaine 1.5%-EPINEPHrine 1:200,000 (XYLOCAINE W/EPI) injection - Epidural   3 mL - 2/13/2025 12:58:00 AM  Number of Attempts: 1  Post Assessment:  Dressing:biopatch applied, occlusive dressing applied and secured with tape  Pt Tolerance:patient tolerated the procedure well with no apparent complications  Complications:no

## 2025-02-13 NOTE — ANESTHESIA PREPROCEDURE EVALUATION
Anesthesia Evaluation     Patient summary reviewed   no history of anesthetic complications:   NPO Solid Status: N/A  NPO Liquid Status: N/A           Airway   Mallampati: II  TM distance: >3 FB  Neck ROM: full  No difficulty expected  Dental - normal exam     Pulmonary - normal exam   (+) ,recent URI  Cardiovascular - normal exam    (+) hypertension, hyperlipidemia      Neuro/Psych  (+) psychiatric history Anxiety  GI/Hepatic/Renal/Endo    (+) thyroid problem hypothyroidism    Musculoskeletal     Abdominal  - normal exam   Substance History - negative use     OB/GYN    (+) Pregnant, Preeclampsia, pregnancy induced hypertension        Other - negative ROS                   Anesthesia Plan    ASA 2     epidural       Anesthetic plan, risks, benefits, and alternatives have been provided, discussed and informed consent has been obtained with: patient.  Pre-procedure education provided  Plan discussed with CRNA.    CODE STATUS:    Level Of Support Discussed With: Patient  Code Status (Patient has no pulse and is not breathing): CPR (Attempt to Resuscitate)  Medical Interventions (Patient has pulse or is breathing): Full Support

## 2025-02-14 ENCOUNTER — TELEPHONE (OUTPATIENT)
Dept: OBSTETRICS AND GYNECOLOGY | Facility: CLINIC | Age: 35
End: 2025-02-14
Payer: COMMERCIAL

## 2025-02-14 RX ADMIN — DOCUSATE SODIUM 100 MG: 100 CAPSULE, LIQUID FILLED ORAL at 08:17

## 2025-02-14 RX ADMIN — IBUPROFEN 600 MG: 600 TABLET, FILM COATED ORAL at 04:57

## 2025-02-14 RX ADMIN — ACETAMINOPHEN 650 MG: 325 TABLET ORAL at 10:10

## 2025-02-14 RX ADMIN — DOCUSATE SODIUM 100 MG: 100 CAPSULE, LIQUID FILLED ORAL at 21:00

## 2025-02-14 RX ADMIN — IBUPROFEN 600 MG: 600 TABLET, FILM COATED ORAL at 11:02

## 2025-02-14 NOTE — LACTATION NOTE
LC in to assist with this feeding. Patient states baby is struggling with latch to the right breast. She did latch baby herself to the left and was able to get a deep latch with good swallows. No nipple tenderness noted. LC assisted with football hold to the right breast after baby finished the left but this was not successful so then position changed to the cradle hold and still no latch achieved. Patient encouraged to continue pumping on the right breast with each infant feeding if no latch was achieved.

## 2025-02-14 NOTE — PLAN OF CARE
Problem: Adult Inpatient Plan of Care  Goal: Plan of Care Review  Outcome: Progressing  Goal: Patient-Specific Goal (Individualized)  Outcome: Progressing  Goal: Absence of Hospital-Acquired Illness or Injury  Outcome: Progressing  Intervention: Identify and Manage Fall Risk  Recent Flowsheet Documentation  Taken 2025 1300 by Nikole Summers RNA  Safety Promotion/Fall Prevention: safety round/check completed  Goal: Optimal Comfort and Wellbeing  Outcome: Progressing  Goal: Readiness for Transition of Care  Outcome: Progressing     Problem: Hypertensive Disorders in Pregnancy  Goal: Patient-Fetal Stabilization  Outcome: Progressing     Problem: Postpartum (Vaginal Delivery)  Goal: Successful Parent Role Transition  Outcome: Progressing  Goal: Hemostasis  Outcome: Progressing  Goal: Absence of Infection Signs and Symptoms  Outcome: Progressing  Goal: Anesthesia/Sedation Recovery  Outcome: Progressing  Intervention: Optimize Anesthesia Recovery  Recent Flowsheet Documentation  Taken 2025 1300 by Nikole Summers RNA  Safety Promotion/Fall Prevention: safety round/check completed  Goal: Optimal Pain Control and Function  Outcome: Progressing  Goal: Effective Urinary Elimination  Outcome: Progressing     Problem: Breastfeeding  Goal: Effective Breastfeeding  Outcome: Progressing   Goal Outcome Evaluation:    Patient progressing well following . Pain well controlled with PRN medications. Bleeding and fundal height WDL. VS WDL. Breastfeeding education reinforced several times through the shift.

## 2025-02-14 NOTE — PROGRESS NOTES
[facility]  Vaginal Delivery Progress Note    Subjective   Postpartum Day 1: Vaginal Delivery    The patient feels well.  Her pain is well controlled with nonsteroidal anti-inflammatory drugs and Tylenol.   She is ambulating well.  Patient describes her bleeding as moderate lochia.    Breastfeeding: without difficulty.    Objective     Vital Signs Range for the last 24 hours  Temperature: Temp:  [98 °F (36.7 °C)-98.7 °F (37.1 °C)] 98.4 °F (36.9 °C)   Temp Source: Temp src: Oral   BP: BP: (116-147)/(69-96) 122/86   Pulse: Heart Rate:  [] 81   Respirations: Resp:  [16-18] 16       Physical Exam:  General:  no acute distress.  Abdomen: abdomen is soft without significant tenderness, masses, organomegaly or guarding.   Fundus: appropriate, firm, non tender, small lochia   Extremities: normal, atraumatic, no cyanosis, and trace edema.     Rubella:   Rubella Antibodies, IgG   Date Value Ref Range Status   08/05/2024 1.82 Immune >0.99 index Final     Comment:                                     Non-immune       <0.90                                  Equivocal  0.90 - 0.99                                  Immune           >0.99     Rh Status:    RH type   Date Value Ref Range Status   02/11/2025 Positive  Final     Immunizations:   Immunization History   Administered Date(s) Administered    COVID-19 (PFIZER) BIVALENT 12+YRS 09/27/2022    COVID-19 (PFIZER) Purple Cap Monovalent 12/27/2020, 01/17/2021, 01/07/2022    COVID-19 (UNSPECIFIED) 11/02/2024    DTaP 1992, 1992, 1992, 12/06/1993, 06/02/1999    Fluzone  >6mos 10/04/2024    Fluzone (or Fluarix & Flulaval for VFC) >6mos 09/22/2022, 10/16/2023    Fluzone Quad >6mos (Multi-dose) 10/01/2022    Hepatitis B Adult/Adolescent IM 08/13/1999, 09/16/1999, 02/16/2000    HiB 1992, 1992, 1992, 09/15/1993    IPV 1992, 1992, 1992, 12/06/1993    Influenza, Unspecified 10/01/2016, 12/21/2021    MMR 09/15/1993, 04/28/2004     Meningococcal, Unspecified 06/10/2008, 05/01/2010    Td (TDVAX) 05/01/2010    Tdap 05/04/2022, 12/09/2024       Assessment & Plan       Preeclampsia, third trimester    Preeclampsia      Kelley L Naegele is Day 1  post-partum  Vaginal, Spontaneous  .      Plan:  Continue current care.  Doing well;  Bps good, cont to monitor      Electronically signed by Zee Clifford MD, 02/14/25, 8:31 AM EST.

## 2025-02-14 NOTE — PLAN OF CARE
Goal Outcome Evaluation:   Pt stable, breastfeeding and pumping. Fundus firm, lochia WNL. Bonding well with infant.

## 2025-02-14 NOTE — ANESTHESIA POSTPROCEDURE EVALUATION
Patient: Kelley L Naegele    Procedure Summary       Date: 02/13/25 Room / Location:     Anesthesia Start: 0050 Anesthesia Stop: 0733    Procedure: LABOR ANALGESIA Diagnosis:     Scheduled Providers:  Provider: Madeleine Davidson CRNA    Anesthesia Type: epidural ASA Status: 2            Anesthesia Type: epidural    Vitals  Vitals Value Taken Time   /86 02/14/25 0500   Temp 36.9 °C (98.4 °F) 02/14/25 0500   Pulse 81 02/14/25 0500   Resp 16 02/14/25 0500   SpO2 99 % 02/13/25 0705   Vitals shown include unfiled device data.        Post Anesthesia Care and Evaluation    Patient location during evaluation: bedside  Patient participation: complete - patient participated  Level of consciousness: awake  Pain management: adequate    Airway patency: patent  Anesthetic complications: No anesthetic complications  PONV Status: controlled  Cardiovascular status: acceptable and stable  Respiratory status: acceptable  Hydration status: acceptable  Post Neuraxial Block status: Motor and sensory function returned to baseline and No signs or symptoms of PDPH

## 2025-02-14 NOTE — TELEPHONE ENCOUNTER
Provider: DR MCCOY    Caller: Naegele, Kelley L    Phone Number: [phone] / LVM    Reason for Call: PT CALLING TO LET DR MCCOY KNOW THAT A FAX WILL BE COMING FROM [employer] FOR HIM TO FILL OUT FOR THE PT'S FMLA ([employer]AVE)    PLEASE CALL THE PT TO LET HER KNOW ONCE REC'D

## 2025-02-15 VITALS
DIASTOLIC BLOOD PRESSURE: 84 MMHG | TEMPERATURE: 98 F | SYSTOLIC BLOOD PRESSURE: 128 MMHG | RESPIRATION RATE: 16 BRPM | OXYGEN SATURATION: 96 % | HEART RATE: 92 BPM | BODY MASS INDEX: 29.62 KG/M2 | HEIGHT: 65 IN

## 2025-02-15 RX ORDER — IBUPROFEN 600 MG/1
600 TABLET, FILM COATED ORAL EVERY 6 HOURS PRN
Qty: 60 TABLET | Refills: 0 | Status: SHIPPED | OUTPATIENT
Start: 2025-02-15

## 2025-02-15 RX ORDER — ACETAMINOPHEN 325 MG/1
650 TABLET ORAL EVERY 4 HOURS PRN
Qty: 30 TABLET | Refills: 0 | Status: SHIPPED | OUTPATIENT
Start: 2025-02-15

## 2025-02-15 RX ADMIN — IBUPROFEN 600 MG: 600 TABLET, FILM COATED ORAL at 08:22

## 2025-02-15 RX ADMIN — DOCUSATE SODIUM 100 MG: 100 CAPSULE, LIQUID FILLED ORAL at 08:22

## 2025-02-15 NOTE — PLAN OF CARE
Problem: Adult Inpatient Plan of Care  Goal: Plan of Care Review  Outcome: Met  Flowsheets (Taken 2/15/2025 1051)  Progress: improving  Goal: Patient-Specific Goal (Individualized)  Outcome: Met  Goal: Absence of Hospital-Acquired Illness or Injury  Outcome: Met  Intervention: Identify and Manage Fall Risk  Recent Flowsheet Documentation  Taken 2/15/2025 1020 by Michelle Thomson RN  Safety Promotion/Fall Prevention:   clutter free environment maintained   nonskid shoes/slippers when out of bed   safety round/check completed  Intervention: Prevent Infection  Recent Flowsheet Documentation  Taken 2/15/2025 1020 by Michelle Thomson RN  Infection Prevention:   cohorting utilized   environmental surveillance performed   equipment surfaces disinfected   hand hygiene promoted   rest/sleep promoted   single patient room provided   visitors restricted/screened  Goal: Optimal Comfort and Wellbeing  Outcome: Met  Intervention: Provide Person-Centered Care  Recent Flowsheet Documentation  Taken 2/15/2025 1020 by Michelle Thomson RN  Trust Relationship/Rapport:   care explained   choices provided   emotional support provided   empathic listening provided   questions answered   questions encouraged   reassurance provided   thoughts/feelings acknowledged  Goal: Readiness for Transition of Care  Outcome: Met     Problem: Hypertensive Disorders in Pregnancy  Goal: Patient-Fetal Stabilization  Outcome: Met  Intervention: Monitor and Manage Symptom Progression  Recent Flowsheet Documentation  Taken 2/15/2025 1020 by Michelle Thomson RN  Medication Review/Management: medications reviewed     Problem: Postpartum (Vaginal Delivery)  Goal: Successful Parent Role Transition  Outcome: Met  Goal: Hemostasis  Outcome: Met  Goal: Absence of Infection Signs and Symptoms  Outcome: Met  Intervention: Prevent or Manage Infection  Recent Flowsheet Documentation  Taken 2/15/2025 1020 by Michelle Thomson RN  Infection Management: aseptic technique  maintained  Goal: Anesthesia/Sedation Recovery  Outcome: Met  Intervention: Optimize Anesthesia Recovery  Recent Flowsheet Documentation  Taken 2/15/2025 1020 by Michelle Thomson, RN  Safety Promotion/Fall Prevention:   clutter free environment maintained   nonskid shoes/slippers when out of bed   safety round/check completed  Goal: Optimal Pain Control and Function  Outcome: Met  Intervention: Prevent or Manage Pain  Recent Flowsheet Documentation  Taken 2/15/2025 1020 by Michelle Thomson RN  Perineal Care:   absorbent brief/pad changed   medicated pads applied   perineal spray bottle/warm water use encouraged   perineum cleansed   topical anesthetic preparation applied  Goal: Effective Urinary Elimination  Outcome: Met     Problem: Breastfeeding  Goal: Effective Breastfeeding  Outcome: Met   Goal Outcome Evaluation:           Progress: improving

## 2025-02-15 NOTE — LACTATION NOTE
LN to see this  patient to follow-up on breastfeeding progress. Mother reports baby has been sleepy this morning after circumcision and not feeding well. Mother pumped 4mls and syringe fed  an hr ago. Infant showing hunger cues and reviewed with mother. Placed infant skin to skin and infant latched well and nursed vigorously with deep jaw excursion and audible swallows noted. LN encouraged mother to attempt and wake baby every 2 hours, and if baby does not respond well to pump and feed infant expressed milk. Discussed hand expression and mother able to return demo. Encouraged her to do skin to skin as much as possible. Mother still reports hardness of breasts after nursing for 20 mins. Encouraged mother to use warm compress on breasts and to pump until soft (comfortable) and follow with cool compress as needed. Discussed relationship between demand (stimulation) and milk supply.     Patient is planning to discharge today. LN discussed normal  feeding behavior during the first few days of breastfeeding and normal infant output patterns to expect. Patient has a breastpump for home use and LN discussed good pumping guidelines and normal expectations with pumping and storage and preparation of ebm for feedings. LN discussed nipple care, plugged ducts, engorgement, and breast infection. LN discussed alcohol use and cigarette/second hand smoke around baby and breastfeeding and discussed the impact of street drugs on infants and breastfeeding. LN used the page in the breastfeeding guide to discuss harmful effects of these. LN discussed checking to make sure new medications are safe to breastfeed. Lactation expectations and anticipatory guidance discussed for the next two weeks. LN encouraged mom to see pediatrician within two days from discharge for follow up.  LN discussed breastfeeding/lactation resources after discharge and when to call the doctor. All questions answered. Patient verbalized good  understanding.

## 2025-02-15 NOTE — PLAN OF CARE
Goal Outcome Evaluation:           Progress: improving  Outcome Evaluation: patient is up adlib, vital signs stable, and voiding. positive bonding with infant noted. breastfeeding education provided. spouse at bedside and supportive. pain well controlled.

## 2025-02-15 NOTE — DISCHARGE SUMMARY
[facility]  Delivery Discharge Summary    Primary OB Clinician: Dr. Manuelito Moralez MD    EDC: Estimated Date of Delivery: 3/6/25    Admitting Diagnosis:  Preeclampsia, third trimester [O14.93]  Preeclampsia [O14.90]    Discharge Diagnosis:  Same as Admitting plus:   Pregnancy at 37w0d - Delivered     Antepartum complications: Preeclampsia    Date of Delivery: 2025  Time of Delivery: 7:33 AM    [facility]    Delivery Type: Vaginal, Spontaneous     Tubal Ligation: n/a    Baby:male infant;   Apgar:  8  @ 1 minute /   Apgar:  9  @ 5 minutes   Weight: 3020 g (6 lb 10.5 oz)   Length: 19.5    Anesthesia: Spinal;Epidural     Intrapartum complications: OP presentation.  Failed back    Laceration: No    Episiotomy: Midline episiotomy    Placenta: Spontaneous    Feeding method: Breastfeeding Status: Yes    Hospital course and discharge exam:  Patient is a 34-year-old  1 now para 1-0-0-1 female at 37 weeks estimate gestational age who was sent to the office with elevated blood pressures.  PIH labs confirm preeclampsia.  She had an elevated LFT.  Patient was admitted for observation and received course of steroid.  Subsequently she was induced.  She had a vaginal delivery productive of a viable male infant weighing 6 pounds 10-1/2 ounces with Apgars of 8 and 9.  Postpartum course patient has been uncomplicated.  She is tolerating p.o. intake with any difficulty.  She reports minimal vaginal bleeding.  At the time of discharge:  Blood pressure is 125/79  Lungs are clear to auscultation bilaterally  Heart is regular rate and rhythm  Abdomen soft nontender the uterus firm at U- 3 station    Patient will be discharged home on Tylenol Motrin for pain control.  She is flori follow-up in the office in 6 weeks for postpartum exam.  She is flori follow-up sooner for elevated blood pressure, headache, visual disturbance, right upper quadrant pain, fever, chills, persistent nausea vomiting and worsening vaginal  bleeding    Discharge Date: 2/15/2025; Discharge Time: 08:27 EST        Plan:      Follow-up appointment with Dr Moralez.

## 2025-02-16 ENCOUNTER — MATERNAL SCREENING (OUTPATIENT)
Dept: CALL CENTER | Facility: HOSPITAL | Age: 35
End: 2025-02-16
Payer: COMMERCIAL

## 2025-02-16 NOTE — OUTREACH NOTE
Maternal Screening Survey      Flowsheet Row Responses   Eligibility Eligible   Prep survey completed? Yes   Facility patient discharged from? [facility] - Registered Nurse

## 2025-02-20 NOTE — TELEPHONE ENCOUNTER
LVM - [employer] paperwork received - Please let Rosie speak w/ patient when calls @ [phone] option 3

## 2025-02-22 ENCOUNTER — TELEPHONE (OUTPATIENT)
Dept: LACTATION | Facility: HOSPITAL | Age: 35
End: 2025-02-22
Payer: COMMERCIAL

## 2025-02-22 NOTE — TELEPHONE ENCOUNTER
LN called to check with this patient and her breastfeeding progress. Patient states breastfeeding is going well and has no concerns. She states she has the lactation dept. number ([phone])  to call if she has any needs.

## 2025-02-24 ENCOUNTER — MATERNAL SCREENING (OUTPATIENT)
Dept: CALL CENTER | Facility: HOSPITAL | Age: 35
End: 2025-02-24
Payer: COMMERCIAL

## 2025-02-24 NOTE — OUTREACH NOTE
Maternal Screening Survey      Flowsheet Row Responses   Facility patient discharged from? [facility]   Attempt successful? Yes   Call start time 1121   Call end time 1124   Person spoke with today (if not patient) and relationship patient   I have been able to laugh and see the funny side of things. 0   I have looked forward with enjoyment to things. 0   I have blamed myself unnecessarily when things went wrong. 0   I have been anxious or worried for no good reason. 2   I have felt scared or panicky for no good reason. 0   Things have been getting on top of me. 0   I have been so unhappy that I have had difficulty sleeping. 0   I have felt sad or miserable. 0   I have been so unhappy that I have been crying. 0   The thought of harming myself has occurred to me. 0   [place]  Depression Scale Total 2   Did any of your parents have problems with alcohol or drug use? No   Do any of your peers have problems with alcohol or drug use? No   Does your partner have problems with alcohol or drug use? No   Before you were pregnant did you have problems with alcohol or drug use? (past) No   In the past month, did you drink beer, wine, liquor or use any other drugs? (pregnancy) No   Maternal Screening call completed Yes   Call end time 1124              Jose CONNER - Registered Nurse

## 2025-02-25 ENCOUNTER — POSTPARTUM VISIT (OUTPATIENT)
Dept: OBSTETRICS AND GYNECOLOGY | Facility: CLINIC | Age: 35
End: 2025-02-25
Payer: COMMERCIAL

## 2025-02-25 VITALS
DIASTOLIC BLOOD PRESSURE: 85 MMHG | HEIGHT: 65 IN | BODY MASS INDEX: 26.99 KG/M2 | HEART RATE: 91 BPM | WEIGHT: 162 LBS | SYSTOLIC BLOOD PRESSURE: 138 MMHG

## 2025-02-25 DIAGNOSIS — O14.90 PRE-ECLAMPSIA, ANTEPARTUM: ICD-10-CM

## 2025-02-25 PROBLEM — O14.93 PREECLAMPSIA, THIRD TRIMESTER: Status: RESOLVED | Noted: 2025-02-11 | Resolved: 2025-02-25

## 2025-02-25 PROBLEM — Z34.00 SUPERVISION OF NORMAL FIRST PREGNANCY, ANTEPARTUM: Status: RESOLVED | Noted: 2024-08-05 | Resolved: 2025-02-25

## 2025-02-25 PROBLEM — O13.3 GESTATIONAL HYPERTENSION, THIRD TRIMESTER: Status: RESOLVED | Noted: 2025-02-11 | Resolved: 2025-02-25

## 2025-02-25 NOTE — PROGRESS NOTES
"[facility]  Postpartum Follow Up       CC: BP check    Antepartum or Postpartum Complications: Pre-eclampsia  Delivery type:    Perineum : Midline episiotomy  Feeding: Breast and bottle    Subjective:     Headache:  No  Vision changes:  No  RUQ/epigastric pain:  No  Swelling:  No  Pain:  No  Vaginal Bleeding:  No  Depressed/Anxious:  No  EPDS score: 2    Objective:   /85   Pulse 91   Ht 165.1 cm (65\")   Wt 73.5 kg (162 lb)   LMP 2024 (Exact Date)   Breastfeeding Yes   BMI 26.96 kg/m²     Physical Exam  Vitals and nursing note reviewed.   Constitutional:       General: She is not in acute distress.     Appearance: Normal appearance. She is not ill-appearing.   Neurological:      Mental Status: She is alert and oriented to person, place, and time.   Psychiatric:         Mood and Affect: Mood normal.         Behavior: Behavior normal.         Thought Content: Thought content normal.         Judgment: Judgment normal.       Last PAP:   Last Completed Pap Smear            PAP SMEAR (Every 3 Years) Next due on 2024  IGP,CtNgTv,rfx Aptima HPV ASCU    02/15/2023  IGP,Aptima HPV,Age Gdln    02/15/2023  IGP, Aptima HPV, Rfx 16 / 18,45    2020  IGP,Aptima HPV,Age Gdln    2020  IGP, Rfx Aptima HPV ASCU    Only the first 5 history entries have been loaded, but more history exists.                    Assessment and Plan:  Diagnoses and all orders for this visit:    1. Encounter for postpartum visit (Primary)  Assessment & Plan:  Stable postpartum course  Continue daily prenatal vitamin  Continue postpartum restrictions and pelvic rest      2. Pre-eclampsia, antepartum  Assessment & Plan:  BP is normal.  Patient states even better at home.  The patient is not requiring any antihypertensives.  Will continue to monitor.  Call parameters for blood pressure reviewed.          Counseling:    Return to office for HA unrelieved w rest/hydration/OTC meds, vision " changes, increase in edema, RUQ/epigastric pain, any concerns.  Check BPs at home.  Return to office or L+D if after hours for systolic >155 OR diastolic >105.  Postpartum/Postop  precautions reviewed.  Post-op/Postpartum laceration pain, bleeding, infection precautions.    Follow Up:  Return in about 4 weeks (around 3/25/2025) for Recheck.    Manuelito Moralez MD  2025

## 2025-02-25 NOTE — ASSESSMENT & PLAN NOTE
Stable postpartum course  Continue daily prenatal vitamin  Continue postpartum restrictions and pelvic rest

## 2025-02-25 NOTE — ASSESSMENT & PLAN NOTE
BP is normal.  Patient states even better at home.  The patient is not requiring any antihypertensives.  Will continue to monitor.  Call parameters for blood pressure reviewed.

## 2025-03-25 ENCOUNTER — POSTPARTUM VISIT (OUTPATIENT)
Dept: OBSTETRICS AND GYNECOLOGY | Age: 35
End: 2025-03-25
Payer: COMMERCIAL

## 2025-03-25 VITALS
DIASTOLIC BLOOD PRESSURE: 91 MMHG | HEIGHT: 65 IN | SYSTOLIC BLOOD PRESSURE: 145 MMHG | BODY MASS INDEX: 26.82 KG/M2 | WEIGHT: 161 LBS | HEART RATE: 118 BPM

## 2025-03-25 DIAGNOSIS — F41.9 ANXIETY: ICD-10-CM

## 2025-03-25 DIAGNOSIS — Z30.9 ENCOUNTER FOR CONTRACEPTIVE MANAGEMENT, UNSPECIFIED TYPE: ICD-10-CM

## 2025-03-25 PROBLEM — O14.90 PREECLAMPSIA: Status: RESOLVED | Noted: 2025-02-12 | Resolved: 2025-03-25

## 2025-03-25 PROBLEM — O99.280 HYPOTHYROIDISM AFFECTING PREGNANCY: Status: RESOLVED | Noted: 2024-10-21 | Resolved: 2025-03-25

## 2025-03-25 PROBLEM — E03.9 HYPOTHYROIDISM AFFECTING PREGNANCY: Status: RESOLVED | Noted: 2024-10-21 | Resolved: 2025-03-25

## 2025-03-25 PROCEDURE — 99214 OFFICE O/P EST MOD 30 MIN: CPT | Performed by: OBSTETRICS & GYNECOLOGY

## 2025-03-25 RX ORDER — ACETAMINOPHEN AND CODEINE PHOSPHATE 120; 12 MG/5ML; MG/5ML
1 SOLUTION ORAL DAILY
Qty: 28 TABLET | Refills: 12 | Status: SHIPPED | OUTPATIENT
Start: 2025-03-25 | End: 2026-03-25

## 2025-03-25 RX ORDER — ESCITALOPRAM OXALATE 10 MG/1
10 TABLET ORAL DAILY
Qty: 30 TABLET | Refills: 2 | Status: SHIPPED | OUTPATIENT
Start: 2025-03-25 | End: 2026-03-25

## 2025-03-25 NOTE — PROGRESS NOTES
"[facility]  Postpartum Follow Up Visit    CC:  Postpartum follow up     Antepartum or Postpartum Complications: Pre-eclampsia  Delivery type:    Feeding: Breast, Pumping    Subjective:     Headache:  No  Vision changes:  No  RUQ/epigastric pain:  No  Swelling:  No  Pain:  No  Vaginal Bleeding:  spotting  Depressed/Anxious:  No  EPDS score: 9  The patient is interested in birth control pills.  She also notes an increase in anxiety since delivery.  She reports taking Celexa in the past but felt that had some undesirable side effects.  Is interested in a different medication.    Objective:   /91   Pulse 118   Ht 165.1 cm (65\")   Wt 73 kg (161 lb)   Breastfeeding Yes   BMI 26.79 kg/m²     Physical Exam  Vitals and nursing note reviewed.   Constitutional:       General: She is not in acute distress.     Appearance: Normal appearance. She is not ill-appearing.   Neurological:      Mental Status: She is alert and oriented to person, place, and time.   Psychiatric:         Mood and Affect: Mood normal.         Behavior: Behavior normal.         Thought Content: Thought content normal.         Judgment: Judgment normal.       Last PAP:   Last Completed Pap Smear            Upcoming       PAP SMEAR (Every 3 Years) Next due on 2024  IGP,CtNgTv,rfx Aptima HPV ASCU    02/15/2023  IGP, Aptima HPV, Rfx 16 / 18,45    02/15/2023  IGP,Aptima HPV,Age Gdln    2020  IGP, Rfx Aptima HPV ASCU    2020  IGP,Aptima HPV,Age Gdln     Only the first 5 history entries have been loaded, but more history exists.                          Assessment and Plan:  Diagnoses and all orders for this visit:    1. Encounter for postpartum visit (Primary)    2. Encounter for contraceptive management, unspecified type  Assessment & Plan:  Patient is an in birth control pills.  Given she is breast-feeding we will try a progesterone only birth control pill.  She will start Ortho Micronor 1 tablet " oral daily.  The risk, benefits, alternatives were reviewed.  We discussed the importance of taking the medication at the same time every day for maximal effectiveness.  We discussed that being late or missing with the medication does decrease the effectiveness at preventing pregnancy.    Orders:  -     norethindrone (MICRONOR) 0.35 MG tablet; Take 1 tablet by mouth Daily.  Dispense: 28 tablet; Refill: 12    3. Anxiety  Assessment & Plan:  Lexapro 10 mg daily.  The risk, benefits, and alternatives to medication such as Lexapro were discussed the patient wished to proceed.    Orders:  -     escitalopram (Lexapro) 10 MG tablet; Take 1 tablet by mouth Daily.  Dispense: 30 tablet; Refill: 2        Counseling:  All birth control options reviewed in detail.  R/B/A/SE/E of each wrt pts PMHx and prior BC use  SARA risk w hormonal BIRTH CONTROL reviewed (estrogen containing only), S/Sx to watch for discussed and questions answered.  Newer studies indicate possible increased breast cancer reviewed (both estrogen and progestin only).  Ok to resume intercourse  May resume normal activities  Core strengthening exercises reviewed and recommended  Kegel exercises reviewed and recommended  Ok to return to work/school once patient desires/maternity leave completed    Follow Up:  Return in about 3 months (around 6/25/2025) for Recheck.    Manuelito Moralez MD  03/25/2025

## 2025-03-31 PROBLEM — Z30.9 ENCOUNTER FOR CONTRACEPTIVE MANAGEMENT: Status: ACTIVE | Noted: 2025-03-31

## 2025-03-31 NOTE — ASSESSMENT & PLAN NOTE
Patient is an in birth control pills.  Given she is breast-feeding we will try a progesterone only birth control pill.  She will start Ortho Micronor 1 tablet oral daily.  The risk, benefits, alternatives were reviewed.  We discussed the importance of taking the medication at the same time every day for maximal effectiveness.  We discussed that being late or missing with the medication does decrease the effectiveness at preventing pregnancy.

## 2025-03-31 NOTE — ASSESSMENT & PLAN NOTE
Lexapro 10 mg daily.  The risk, benefits, and alternatives to medication such as Lexapro were discussed the patient wished to proceed.